# Patient Record
Sex: FEMALE | Race: WHITE | NOT HISPANIC OR LATINO | Employment: PART TIME | URBAN - METROPOLITAN AREA
[De-identification: names, ages, dates, MRNs, and addresses within clinical notes are randomized per-mention and may not be internally consistent; named-entity substitution may affect disease eponyms.]

---

## 2024-06-18 ENCOUNTER — OFFICE VISIT (OUTPATIENT)
Age: 24
End: 2024-06-18
Payer: COMMERCIAL

## 2024-06-18 ENCOUNTER — APPOINTMENT (OUTPATIENT)
Age: 24
End: 2024-06-18
Payer: COMMERCIAL

## 2024-06-18 VITALS
DIASTOLIC BLOOD PRESSURE: 73 MMHG | HEART RATE: 78 BPM | SYSTOLIC BLOOD PRESSURE: 109 MMHG | OXYGEN SATURATION: 97 % | WEIGHT: 189 LBS | BODY MASS INDEX: 34.78 KG/M2 | HEIGHT: 62 IN

## 2024-06-18 DIAGNOSIS — Z11.59 NEED FOR HEPATITIS C SCREENING TEST: ICD-10-CM

## 2024-06-18 DIAGNOSIS — Z11.4 SCREENING FOR HIV (HUMAN IMMUNODEFICIENCY VIRUS): ICD-10-CM

## 2024-06-18 DIAGNOSIS — Z13.220 ENCOUNTER FOR LIPID SCREENING FOR CARDIOVASCULAR DISEASE: ICD-10-CM

## 2024-06-18 DIAGNOSIS — E66.09 CLASS 1 OBESITY DUE TO EXCESS CALORIES WITHOUT SERIOUS COMORBIDITY WITH BODY MASS INDEX (BMI) OF 34.0 TO 34.9 IN ADULT: ICD-10-CM

## 2024-06-18 DIAGNOSIS — Z00.00 WELL ADULT EXAM: ICD-10-CM

## 2024-06-18 DIAGNOSIS — Z13.6 ENCOUNTER FOR LIPID SCREENING FOR CARDIOVASCULAR DISEASE: ICD-10-CM

## 2024-06-18 DIAGNOSIS — Z11.59 NEED FOR HEPATITIS C SCREENING TEST: Primary | ICD-10-CM

## 2024-06-18 DIAGNOSIS — J45.20 MILD INTERMITTENT ASTHMA, UNSPECIFIED WHETHER COMPLICATED: ICD-10-CM

## 2024-06-18 DIAGNOSIS — Z12.4 SCREENING FOR CERVICAL CANCER: ICD-10-CM

## 2024-06-18 PROBLEM — E66.811 CLASS 1 OBESITY DUE TO EXCESS CALORIES WITHOUT SERIOUS COMORBIDITY WITH BODY MASS INDEX (BMI) OF 34.0 TO 34.9 IN ADULT: Status: ACTIVE | Noted: 2024-06-18

## 2024-06-18 PROBLEM — J45.909 ASTHMA: Status: ACTIVE | Noted: 2022-03-04

## 2024-06-18 LAB
ALBUMIN SERPL BCP-MCNC: 4.1 G/DL (ref 3.5–5)
ALP SERPL-CCNC: 77 U/L (ref 34–104)
ALT SERPL W P-5'-P-CCNC: 17 U/L (ref 7–52)
ANION GAP SERPL CALCULATED.3IONS-SCNC: 8 MMOL/L (ref 4–13)
AST SERPL W P-5'-P-CCNC: 16 U/L (ref 13–39)
BASOPHILS # BLD AUTO: 0.08 THOUSANDS/ÂΜL (ref 0–0.1)
BASOPHILS NFR BLD AUTO: 1 % (ref 0–1)
BILIRUB SERPL-MCNC: 0.33 MG/DL (ref 0.2–1)
BUN SERPL-MCNC: 10 MG/DL (ref 5–25)
CALCIUM SERPL-MCNC: 8.9 MG/DL (ref 8.4–10.2)
CHLORIDE SERPL-SCNC: 103 MMOL/L (ref 96–108)
CHOLEST SERPL-MCNC: 145 MG/DL
CO2 SERPL-SCNC: 27 MMOL/L (ref 21–32)
CREAT SERPL-MCNC: 0.62 MG/DL (ref 0.6–1.3)
EOSINOPHIL # BLD AUTO: 0.14 THOUSAND/ÂΜL (ref 0–0.61)
EOSINOPHIL NFR BLD AUTO: 2 % (ref 0–6)
ERYTHROCYTE [DISTWIDTH] IN BLOOD BY AUTOMATED COUNT: 13.3 % (ref 11.6–15.1)
GFR SERPL CREATININE-BSD FRML MDRD: 126 ML/MIN/1.73SQ M
GLUCOSE P FAST SERPL-MCNC: 88 MG/DL (ref 65–99)
HCT VFR BLD AUTO: 37.8 % (ref 34.8–46.1)
HCV AB SER QL: NORMAL
HDLC SERPL-MCNC: 42 MG/DL
HGB BLD-MCNC: 12.4 G/DL (ref 11.5–15.4)
HIV 1+2 AB+HIV1 P24 AG SERPL QL IA: NORMAL
HIV 2 AB SERPL QL IA: NORMAL
HIV1 AB SERPL QL IA: NORMAL
HIV1 P24 AG SERPL QL IA: NORMAL
IMM GRANULOCYTES # BLD AUTO: 0.03 THOUSAND/UL (ref 0–0.2)
IMM GRANULOCYTES NFR BLD AUTO: 0 % (ref 0–2)
LDLC SERPL CALC-MCNC: 78 MG/DL (ref 0–100)
LYMPHOCYTES # BLD AUTO: 2.84 THOUSANDS/ÂΜL (ref 0.6–4.47)
LYMPHOCYTES NFR BLD AUTO: 30 % (ref 14–44)
MCH RBC QN AUTO: 27.9 PG (ref 26.8–34.3)
MCHC RBC AUTO-ENTMCNC: 32.8 G/DL (ref 31.4–37.4)
MCV RBC AUTO: 85 FL (ref 82–98)
MONOCYTES # BLD AUTO: 0.44 THOUSAND/ÂΜL (ref 0.17–1.22)
MONOCYTES NFR BLD AUTO: 5 % (ref 4–12)
NEUTROPHILS # BLD AUTO: 6 THOUSANDS/ÂΜL (ref 1.85–7.62)
NEUTS SEG NFR BLD AUTO: 62 % (ref 43–75)
NONHDLC SERPL-MCNC: 103 MG/DL
NRBC BLD AUTO-RTO: 0 /100 WBCS
PLATELET # BLD AUTO: 506 THOUSANDS/UL (ref 149–390)
PMV BLD AUTO: 9.5 FL (ref 8.9–12.7)
POTASSIUM SERPL-SCNC: 3.8 MMOL/L (ref 3.5–5.3)
PROT SERPL-MCNC: 7.2 G/DL (ref 6.4–8.4)
RBC # BLD AUTO: 4.45 MILLION/UL (ref 3.81–5.12)
SODIUM SERPL-SCNC: 138 MMOL/L (ref 135–147)
T4 FREE SERPL-MCNC: 0.9 NG/DL (ref 0.61–1.12)
TRIGL SERPL-MCNC: 124 MG/DL
TSH SERPL DL<=0.05 MIU/L-ACNC: 1.14 UIU/ML (ref 0.45–4.5)
VIT B12 SERPL-MCNC: 316 PG/ML (ref 180–914)
WBC # BLD AUTO: 9.53 THOUSAND/UL (ref 4.31–10.16)

## 2024-06-18 PROCEDURE — 36415 COLL VENOUS BLD VENIPUNCTURE: CPT

## 2024-06-18 PROCEDURE — 99385 PREV VISIT NEW AGE 18-39: CPT | Performed by: FAMILY MEDICINE

## 2024-06-18 PROCEDURE — 80061 LIPID PANEL: CPT

## 2024-06-18 PROCEDURE — 86803 HEPATITIS C AB TEST: CPT

## 2024-06-18 PROCEDURE — 82607 VITAMIN B-12: CPT

## 2024-06-18 PROCEDURE — 80053 COMPREHEN METABOLIC PANEL: CPT

## 2024-06-18 PROCEDURE — 99203 OFFICE O/P NEW LOW 30 MIN: CPT | Performed by: FAMILY MEDICINE

## 2024-06-18 PROCEDURE — 85025 COMPLETE CBC W/AUTO DIFF WBC: CPT

## 2024-06-18 PROCEDURE — 84443 ASSAY THYROID STIM HORMONE: CPT

## 2024-06-18 PROCEDURE — 84439 ASSAY OF FREE THYROXINE: CPT

## 2024-06-18 PROCEDURE — 87389 HIV-1 AG W/HIV-1&-2 AB AG IA: CPT

## 2024-06-18 RX ORDER — ALBUTEROL SULFATE 90 UG/1
2 AEROSOL, METERED RESPIRATORY (INHALATION) EVERY 6 HOURS PRN
Qty: 6.7 G | Refills: 1 | Status: SHIPPED | OUTPATIENT
Start: 2024-06-18

## 2024-06-18 NOTE — PROGRESS NOTES
Adult Annual Physical  Name: Dennise Preston      : 2000      MRN: [id number]  Encounter Provider: AUTUMN Norwood  Encounter Date: 2024   Encounter department: Count includes the [facility] PRIMARY CARE    Assessment & Plan   1. Need for hepatitis C screening test  -     Hepatitis C Antibody; Future  2. Screening for HIV (human immunodeficiency virus)  -     HIV 1/2 AG/AB w Reflex [facility] for 2 yr old and above; Future  -     HIV 1/2 AG/AB w Reflex [facility] for 2 yr old and above; Future  3. Screening for cervical cancer  4. Mild intermittent asthma, unspecified whether complicated  -     albuterol (Ventolin HFA) 90 mcg/act inhaler; Inhale 2 puffs every 6 (six) hours as needed for wheezing  5. Well adult exam  -     Lipid panel; Future  -     Comprehensive metabolic panel; Future  -     CBC and differential; Future  -     TSH + Free T4; Future  6. Class 1 obesity due to excess calories without serious comorbidity with body mass index (BMI) of 34.0 to 34.9 in adult  -     Lipid panel; Future  -     Comprehensive metabolic panel; Future  -     CBC and differential; Future  -     TSH + Free T4; Future  -     Vitamin B12; Future  7. Encounter for lipid screening for cardiovascular disease  -     Lipid panel; Future  -     Comprehensive metabolic panel; Future  -     CBC and differential; Future  -     TSH + Free T4; Future  -     Vitamin B12; Future    Immunizations and preventive care screenings were discussed with patient today. Appropriate education was printed on patient's after visit summary.    Counseling:  Exercise: the importance of regular exercise/physical activity was discussed. Recommend exercise 3-5 times per week for at least 30 minutes.          History of Present Illness   {Disappearing Hyperlinks I Encounters * My Last Note * Since Last Visit * History :29484}  Adult Annual Physical:  Patient presents for annual physical. Patient here to establish care.  When patient was 12 she has had trouble  "with weight gain. Patient tried dieting , exercising w/o losing much weight. Patient has been to the gym without losing weight.   Different medications discussed. Patient is interested in GLP1. Patient denies personal or family hx of MCT or MEN2. Denies hx of pancreatitis  .     Diet and Physical Activity:  - Diet/Nutrition: well balanced diet.  - Exercise: walking.    Depression Screening:  - PHQ-2 Score: 0    General Health:  - Sleep: sleeps well.  - Hearing: normal hearing left ear.  - Vision: no vision problems.  - Dental: regular dental visits.    /GYN Health:  - Follows with GYN: yes.   - Last menstrual cycle: 6/1/2024.     Advanced Care Planning:  - Has an advanced directive?: no    - Has a durable medical POA?: no    - ACP document given to patient?: yes      Review of Systems   Constitutional:  Negative for chills and fever.   HENT:  Negative for ear pain and sore throat.    Eyes:  Negative for pain and visual disturbance.   Respiratory:  Negative for cough and shortness of breath.    Cardiovascular:  Negative for chest pain and palpitations.   Gastrointestinal:  Negative for abdominal pain and vomiting.   Genitourinary:  Negative for dysuria and hematuria.   Musculoskeletal:  Negative for arthralgias and back pain.   Skin:  Negative for color change and rash.   Neurological:  Negative for seizures and syncope.   All other systems reviewed and are negative.        Objective   {Disappearing Hyperlinks   Review Vitals * Enter New Vitals * Results Review * Labs * Imaging * Cardiology * Procedures * Lung Cancer Screening :34940}  /73 (BP Location: Left arm, Patient Position: Sitting, Cuff Size: Standard)   Pulse 78   Ht 5' 2\" (1.575 m)   Wt 85.7 kg (189 lb)   SpO2 97%   BMI 34.57 kg/m²     Physical Exam  Vitals and nursing note reviewed.   Constitutional:       General: She is not in acute distress.     Appearance: She is well-developed. She is obese.   HENT:      Head: Normocephalic and " atraumatic.   Eyes:      Conjunctiva/sclera: Conjunctivae normal.   Cardiovascular:      Rate and Rhythm: Normal rate and regular rhythm.      Heart sounds: No murmur heard.  Pulmonary:      Effort: Pulmonary effort is normal. No respiratory distress.      Breath sounds: Normal breath sounds.   Abdominal:      Palpations: Abdomen is soft.      Tenderness: There is no abdominal tenderness.   Musculoskeletal:         General: No swelling.      Cervical back: Neck supple.   Skin:     General: Skin is warm and dry.      Capillary Refill: Capillary refill takes less than 2 seconds.   Neurological:      Mental Status: She is alert.   Psychiatric:         Mood and Affect: Mood normal.       Administrative Statements {Disappearing Hyperlinks I  Level of Service * [facility]/\[facility] [place] [facility]\""P:20655}

## 2024-06-20 NOTE — ASSESSMENT & PLAN NOTE
Weight loss discusses . Patient has tried many diets and exercise. Patient interested in GlP1. Educated on use and side effects. Patient denies personal or family hx of  MCT or MEN2. Denies hx of pancreatitis.

## 2024-06-26 ENCOUNTER — OFFICE VISIT (OUTPATIENT)
Age: 24
End: 2024-06-26
Payer: COMMERCIAL

## 2024-06-26 VITALS
DIASTOLIC BLOOD PRESSURE: 72 MMHG | HEIGHT: 62 IN | BODY MASS INDEX: 34.78 KG/M2 | WEIGHT: 189 LBS | SYSTOLIC BLOOD PRESSURE: 118 MMHG

## 2024-06-26 DIAGNOSIS — J45.20 MILD INTERMITTENT ASTHMA, UNSPECIFIED WHETHER COMPLICATED: Primary | ICD-10-CM

## 2024-06-26 DIAGNOSIS — J30.1 SEASONAL ALLERGIC RHINITIS DUE TO POLLEN: ICD-10-CM

## 2024-06-26 DIAGNOSIS — E66.09 CLASS 1 OBESITY DUE TO EXCESS CALORIES WITHOUT SERIOUS COMORBIDITY WITH BODY MASS INDEX (BMI) OF 34.0 TO 34.9 IN ADULT: ICD-10-CM

## 2024-06-26 DIAGNOSIS — R79.89 HIGH PLATELET COUNT: ICD-10-CM

## 2024-06-26 PROCEDURE — 99214 OFFICE O/P EST MOD 30 MIN: CPT | Performed by: FAMILY MEDICINE

## 2024-06-26 RX ORDER — OLOPATADINE HYDROCHLORIDE 1 MG/ML
1 SOLUTION/ DROPS OPHTHALMIC 2 TIMES DAILY
Qty: 5 ML | Refills: 1 | Status: SHIPPED | OUTPATIENT
Start: 2024-06-26

## 2024-06-26 RX ORDER — TIRZEPATIDE 2.5 MG/.5ML
2.5 INJECTION, SOLUTION SUBCUTANEOUS WEEKLY
Qty: 2 ML | Refills: 0 | Status: SHIPPED | OUTPATIENT
Start: 2024-06-26 | End: 2024-07-24

## 2024-06-26 RX ORDER — LORATADINE 10 MG/1
10 TABLET ORAL DAILY
Qty: 30 TABLET | Refills: 0 | Status: SHIPPED | OUTPATIENT
Start: 2024-06-26

## 2024-06-26 NOTE — PROGRESS NOTES
Ambulatory Visit  Name: Dennise Preston      : 2000      MRN: [id number]  Encounter Provider: AUTUMN Norwood  Encounter Date: 2024   Encounter department: [facility] PRIMARY CARE    Assessment & Plan   1. Mild intermittent asthma, unspecified whether complicated  Assessment & Plan:  Controlled with Albutero   2. Class 1 obesity due to excess calories without serious comorbidity with body mass index (BMI) of 34.0 to 34.9 in adult  Assessment & Plan:  Weight loss discusses . Patient has tried many diets and exercise. Patient interested in GlP1. Educated on use and side effects. Patient denies personal or family hx of  MCT or MEN2. Denies hx of pancreatitis.                    Orders:  -     tirzepatide (Zepbound) 2.5 mg/0.5 mL auto-injector; Inject 0.5 mL (2.5 mg total) under the skin once a week for 28 days  3. High platelet count  Assessment & Plan:  Iron panel ordered  Orders:  -     Ferritin; Future  -     TIBC Panel (incl. Iron, TIBC, % Iron Saturation); Future  -     CBC and differential; Future  -     Comprehensive metabolic panel; Future  4. Seasonal allergic rhinitis due to pollen  Assessment & Plan:  Loratadine and eye drops ordered.  Orders:  -     loratadine (CLARITIN) 10 mg tablet; Take 1 tablet (10 mg total) by mouth daily  -     olopatadine (PATANOL) 0.1 % ophthalmic solution; Administer 1 drop to both eyes 2 (two) times a day         History of Present Illness     Patient  here for a follow up.    Dennise Preston is here for chronic conditions f/u. Pt. had labs done prior to today's visit which included Recent Results (from the past 672 hour(s))  -Hepatitis C Antibody:   Collection Time: 24 12:06 PM       Result                      Value             Ref Range           Hepatitis C Ab              Non-reactive      Non-Reactive   -HIV 1/2 AG/AB w Reflex [facility] for 2 yr old and above:   Collection Time: 24 12:06 PM       Result                      Value              Ref Range           HIV-1 p24 Antigen           Non-Reactive      Non-Reactive        HIV-1 Antibody              Non-Reactive      Non-Reactive        HIV-2 Antibody              Non-Reactive      Non-Reactive        HIV Ag-Ab 5th Gen           Non-Reactive      Non-Reactive   -Lipid panel:   Collection Time: 06/18/24 12:06 PM       Result                      Value             Ref Range           Cholesterol                 145               See Comment *       Triglycerides               124               See Comment *       HDL, Direct                 42 (L)            >=50 mg/dL          LDL Calculated              78                0 - 100 mg/dL       Non-HDL-Chol (CHOL-HDL)     103               mg/dl          -Comprehensive metabolic panel:   Collection Time: 06/18/24 12:06 PM       Result                      Value             Ref Range           Sodium                      138               135 - 147 mm*       Potassium                   3.8               3.5 - 5.3 mm*       Chloride                    103               96 - 108 mmo*       CO2                         27                21 - 32 mmol*       ANION GAP                   8                 4 - 13 mmol/L       BUN                         10                5 - 25 mg/dL        Creatinine                  0.62              0.60 - 1.30 *       Glucose, Fasting            88                65 - 99 mg/dL       Calcium                     8.9               8.4 - 10.2 m*       AST                         16                13 - 39 U/L         ALT                         17                7 - 52 U/L          Alkaline Phosphatase        77                34 - 104 U/L        Total Protein               7.2               6.4 - 8.4 g/*       Albumin                     4.1               3.5 - 5.0 g/*       Total Bilirubin             0.33              0.20 - 1.00 *       eGFR                        126               ml/min/1.73s*  -CBC and differential:    Collection Time: 06/18/24 12:06 PM       Result                      Value             Ref Range           WBC                         9.53              4.31 - 10.16*       RBC                         4.45              3.81 - 5.12 *       Hemoglobin                  12.4              11.5 - 15.4 *       Hematocrit                  37.8              34.8 - 46.1 %       MCV                         85                82 - 98 fL          MCH                         27.9              26.8 - 34.3 *       MCHC                        32.8              31.4 - 37.4 *       RDW                         13.3              11.6 - 15.1 %       MPV                         9.5               8.9 - 12.7 fL       Platelets                   506 (H)           149 - 390 Th*       nRBC                        0                 /100 WBCs           Segmented %                 62                43 - 75 %           Immature Grans %            0                 0 - 2 %             Lymphocytes %               30                14 - 44 %           Monocytes %                 5                 4 - 12 %            Eosinophils Relative        2                 0 - 6 %             Basophils Relative          1                 0 - 1 %             Absolute Neutrophils        6.00              1.85 - 7.62 *       Absolute Immature Grans     0.03              0.00 - 0.20 *       Absolute Lymphocytes        2.84              0.60 - 4.47 *       Absolute Monocytes          0.44              0.17 - 1.22 *       Eosinophils Absolute        0.14              0.00 - 0.61 *       Basophils Absolute          0.08              0.00 - 0.10 *  -Vitamin B12:   Collection Time: 06/18/24 12:06 PM       Result                      Value             Ref Range           Vitamin B-12                316               180 - 914 pg*  -TSH, 3rd generation:   Collection Time: 06/18/24 12:06 PM       Result                      Value             Ref Range           TSH 3RD  "WESLY           1.136             0.450 - 4.50*  -T4, free:   Collection Time: 24 12:06 PM       Result                      Value             Ref Range           Free T4                     0.90              0.61 - 1.12 *       Review of Systems   Constitutional:  Negative for chills and fever.   HENT:  Negative for ear pain and sore throat.    Eyes:  Negative for pain and visual disturbance.   Respiratory:  Negative for cough and shortness of breath.    Cardiovascular:  Negative for chest pain and palpitations.   Gastrointestinal:  Negative for abdominal pain and vomiting.   Genitourinary:  Negative for dysuria and hematuria.   Musculoskeletal:  Negative for arthralgias and back pain.   Skin:  Negative for color change and rash.   Neurological:  Negative for seizures and syncope.   All other systems reviewed and are negative.    Past Medical History:   Diagnosis Date    Allergic      Past Surgical History:   Procedure Laterality Date     SECTION       History reviewed. No pertinent family history.  Social History     Tobacco Use    Smoking status: Never    Smokeless tobacco: Never   Vaping Use    Vaping status: Never Used   Substance and Sexual Activity    Alcohol use: Not on file    Drug use: Not on file    Sexual activity: Not on file     Current Outpatient Medications on File Prior to Visit   Medication Sig    albuterol (Ventolin HFA) 90 mcg/act inhaler Inhale 2 puffs every 6 (six) hours as needed for wheezing     Allergies   Allergen Reactions    Pollen Extract Itching       There is no immunization history on file for this patient.  Objective     /72 (BP Location: Right arm, Patient Position: Sitting, Cuff Size: Standard)   Ht 5' 2\" (1.575 m)   Wt 85.7 kg (189 lb)   BMI 34.57 kg/m²     Physical Exam  Vitals and nursing note reviewed.   Constitutional:       General: She is not in acute distress.     Appearance: She is well-developed.   HENT:      Head: Normocephalic and atraumatic. "   Eyes:      Conjunctiva/sclera: Conjunctivae normal.   Cardiovascular:      Rate and Rhythm: Normal rate and regular rhythm.      Heart sounds: No murmur heard.  Pulmonary:      Effort: Pulmonary effort is normal. No respiratory distress.      Breath sounds: Normal breath sounds.   Abdominal:      Palpations: Abdomen is soft.      Tenderness: There is no abdominal tenderness.   Musculoskeletal:         General: No swelling.      Cervical back: Neck supple.   Skin:     General: Skin is warm and dry.      Capillary Refill: Capillary refill takes less than 2 seconds.   Neurological:      Mental Status: She is alert.   Psychiatric:         Mood and Affect: Mood normal.       Administrative Statements

## 2024-06-26 NOTE — ADDENDUM NOTE
Addended by: DILAN HIGGINS on: 6/26/2024 11:33 AM     Modules accepted: Orders, Level of Service

## 2024-07-03 ENCOUNTER — PATIENT MESSAGE (OUTPATIENT)
Age: 24
End: 2024-07-03

## 2024-07-03 DIAGNOSIS — J45.20 MILD INTERMITTENT ASTHMA, UNSPECIFIED WHETHER COMPLICATED: Primary | ICD-10-CM

## 2024-07-11 ENCOUNTER — PATIENT MESSAGE (OUTPATIENT)
Age: 24
End: 2024-07-11

## 2024-07-11 RX ORDER — ALBUTEROL SULFATE 90 UG/1
2 AEROSOL, METERED RESPIRATORY (INHALATION) EVERY 6 HOURS PRN
Qty: 6.7 G | Refills: 1 | Status: SHIPPED | OUTPATIENT
Start: 2024-07-11

## 2024-08-22 ENCOUNTER — APPOINTMENT (OUTPATIENT)
Age: 24
End: 2024-08-22
Payer: COMMERCIAL

## 2024-08-22 ENCOUNTER — TELEPHONE (OUTPATIENT)
Age: 24
End: 2024-08-22

## 2024-08-22 ENCOUNTER — OFFICE VISIT (OUTPATIENT)
Age: 24
End: 2024-08-22
Payer: COMMERCIAL

## 2024-08-22 VITALS
BODY MASS INDEX: 35.96 KG/M2 | OXYGEN SATURATION: 98 % | SYSTOLIC BLOOD PRESSURE: 118 MMHG | DIASTOLIC BLOOD PRESSURE: 80 MMHG | WEIGHT: 195.4 LBS | HEART RATE: 84 BPM | HEIGHT: 62 IN

## 2024-08-22 DIAGNOSIS — F41.9 ANXIETY: ICD-10-CM

## 2024-08-22 DIAGNOSIS — J45.20 MILD INTERMITTENT ASTHMA, UNSPECIFIED WHETHER COMPLICATED: ICD-10-CM

## 2024-08-22 DIAGNOSIS — J30.1 SEASONAL ALLERGIC RHINITIS DUE TO POLLEN: ICD-10-CM

## 2024-08-22 DIAGNOSIS — R79.89 HIGH PLATELET COUNT: ICD-10-CM

## 2024-08-22 LAB
ALBUMIN SERPL BCG-MCNC: 4.1 G/DL (ref 3.5–5)
ALP SERPL-CCNC: 88 U/L (ref 34–104)
ALT SERPL W P-5'-P-CCNC: 17 U/L (ref 7–52)
ANION GAP SERPL CALCULATED.3IONS-SCNC: 9 MMOL/L (ref 4–13)
AST SERPL W P-5'-P-CCNC: 17 U/L (ref 13–39)
BASOPHILS # BLD AUTO: 0.07 THOUSANDS/ÂΜL (ref 0–0.1)
BASOPHILS NFR BLD AUTO: 1 % (ref 0–1)
BILIRUB SERPL-MCNC: 0.23 MG/DL (ref 0.2–1)
BUN SERPL-MCNC: 12 MG/DL (ref 5–25)
CALCIUM SERPL-MCNC: 9.1 MG/DL (ref 8.4–10.2)
CHLORIDE SERPL-SCNC: 102 MMOL/L (ref 96–108)
CO2 SERPL-SCNC: 24 MMOL/L (ref 21–32)
CREAT SERPL-MCNC: 0.65 MG/DL (ref 0.6–1.3)
EOSINOPHIL # BLD AUTO: 0.2 THOUSAND/ÂΜL (ref 0–0.61)
EOSINOPHIL NFR BLD AUTO: 2 % (ref 0–6)
ERYTHROCYTE [DISTWIDTH] IN BLOOD BY AUTOMATED COUNT: 13.3 % (ref 11.6–15.1)
FERRITIN SERPL-MCNC: 16 NG/ML (ref 11–307)
GFR SERPL CREATININE-BSD FRML MDRD: 123 ML/MIN/1.73SQ M
GLUCOSE SERPL-MCNC: 99 MG/DL (ref 65–140)
HCT VFR BLD AUTO: 38.3 % (ref 34.8–46.1)
HGB BLD-MCNC: 12.5 G/DL (ref 11.5–15.4)
IMM GRANULOCYTES # BLD AUTO: 0.03 THOUSAND/UL (ref 0–0.2)
IMM GRANULOCYTES NFR BLD AUTO: 0 % (ref 0–2)
IRON SATN MFR SERPL: 11 % (ref 15–50)
IRON SERPL-MCNC: 41 UG/DL (ref 50–212)
LYMPHOCYTES # BLD AUTO: 3.15 THOUSANDS/ÂΜL (ref 0.6–4.47)
LYMPHOCYTES NFR BLD AUTO: 31 % (ref 14–44)
MCH RBC QN AUTO: 27.4 PG (ref 26.8–34.3)
MCHC RBC AUTO-ENTMCNC: 32.6 G/DL (ref 31.4–37.4)
MCV RBC AUTO: 84 FL (ref 82–98)
MONOCYTES # BLD AUTO: 0.44 THOUSAND/ÂΜL (ref 0.17–1.22)
MONOCYTES NFR BLD AUTO: 4 % (ref 4–12)
NEUTROPHILS # BLD AUTO: 6.21 THOUSANDS/ÂΜL (ref 1.85–7.62)
NEUTS SEG NFR BLD AUTO: 62 % (ref 43–75)
NRBC BLD AUTO-RTO: 0 /100 WBCS
PLATELET # BLD AUTO: 498 THOUSANDS/UL (ref 149–390)
PMV BLD AUTO: 9.1 FL (ref 8.9–12.7)
POTASSIUM SERPL-SCNC: 3.8 MMOL/L (ref 3.5–5.3)
PROT SERPL-MCNC: 7.7 G/DL (ref 6.4–8.4)
RBC # BLD AUTO: 4.57 MILLION/UL (ref 3.81–5.12)
SODIUM SERPL-SCNC: 135 MMOL/L (ref 135–147)
TIBC SERPL-MCNC: 358 UG/DL (ref 250–450)
UIBC SERPL-MCNC: 317 UG/DL (ref 155–355)
WBC # BLD AUTO: 10.1 THOUSAND/UL (ref 4.31–10.16)

## 2024-08-22 PROCEDURE — 85025 COMPLETE CBC W/AUTO DIFF WBC: CPT

## 2024-08-22 PROCEDURE — 83540 ASSAY OF IRON: CPT

## 2024-08-22 PROCEDURE — 80053 COMPREHEN METABOLIC PANEL: CPT

## 2024-08-22 PROCEDURE — 36415 COLL VENOUS BLD VENIPUNCTURE: CPT

## 2024-08-22 PROCEDURE — 82728 ASSAY OF FERRITIN: CPT

## 2024-08-22 PROCEDURE — 99214 OFFICE O/P EST MOD 30 MIN: CPT | Performed by: FAMILY MEDICINE

## 2024-08-22 PROCEDURE — 83550 IRON BINDING TEST: CPT

## 2024-08-22 RX ORDER — BUPROPION HYDROCHLORIDE 150 MG/1
150 TABLET ORAL EVERY MORNING
Qty: 30 TABLET | Refills: 5 | Status: SHIPPED | OUTPATIENT
Start: 2024-08-22 | End: 2025-02-18

## 2024-08-22 NOTE — TELEPHONE ENCOUNTER
Called Pt in regards to referral received, in attempts to verify needs of services and advised of current wait list.  No answer, lvm for patient to call back at [phone], opt 3.    1st attempt.

## 2024-08-22 NOTE — PROGRESS NOTES
Ambulatory Visit  Name: Dennise Preston      : 2000      MRN: [id number]  Encounter Provider: AUTUMN Norwood  Encounter Date: 2024   Encounter department: [facility] PRIMARY CARE    Assessment & Plan   1. BMI 35.0-35.9,adult  Assessment & Plan:  Weight loss discusses . Patient has tried many diets and exercise. Patient interested in GlP1. Educated on use and side effects. Patient denies personal or family hx of  MCT or MEN2. Denies hx of pancreatitis.      2. Mild intermittent asthma, unspecified whether complicated  Assessment & Plan:  Controlled with Albuterol  3. Seasonal allergic rhinitis due to pollen  Assessment & Plan:  Loratadine and eye drops ordered.  4. High platelet count  Assessment & Plan:  Iron panel ordered  5. Anxiety  Assessment & Plan:  Will order Wellbutrin. Patient encouraged to talk to a therapist  Orders:  -     buPROPion (WELLBUTRIN XL) 150 mg 24 hr tablet; Take 1 tablet (150 mg total) by mouth every morning  -     Ambulatory referral to Psych Services; Future         History of Present Illness     Patient seen for a follow up. Patient is still struggling to lose weight. She is very stressed because she has tried so many different diets and exercise and is not able to lose weight at all. Zepbound was denied by her insurance       Review of Systems   Constitutional:  Negative for chills and fever.   HENT:  Negative for ear pain and sore throat.    Eyes:  Negative for pain and visual disturbance.   Respiratory:  Negative for cough and shortness of breath.    Cardiovascular:  Negative for chest pain and palpitations.   Gastrointestinal:  Negative for abdominal pain and vomiting.   Genitourinary:  Negative for dysuria and hematuria.   Musculoskeletal:  Negative for arthralgias and back pain.   Skin:  Negative for color change and rash.   Neurological:  Negative for seizures and syncope.   Psychiatric/Behavioral:  The patient is nervous/anxious.    All other systems  "reviewed and are negative.    Past Medical History:   Diagnosis Date    Allergic      Past Surgical History:   Procedure Laterality Date     SECTION       History reviewed. No pertinent family history.  Social History     Tobacco Use    Smoking status: Never    Smokeless tobacco: Never   Vaping Use    Vaping status: Never Used   Substance and Sexual Activity    Alcohol use: Not on file    Drug use: Not on file    Sexual activity: Not on file     Current Outpatient Medications on File Prior to Visit   Medication Sig    albuterol (ProAir HFA) 90 mcg/act inhaler Inhale 2 puffs every 6 (six) hours as needed for wheezing    albuterol (Ventolin HFA) 90 mcg/act inhaler Inhale 2 puffs every 6 (six) hours as needed for wheezing    loratadine (CLARITIN) 10 mg tablet Take 1 tablet (10 mg total) by mouth daily    olopatadine (PATANOL) 0.1 % ophthalmic solution Administer 1 drop to both eyes 2 (two) times a day     Allergies   Allergen Reactions    Pollen Extract Itching       There is no immunization history on file for this patient.  Objective     /80 (BP Location: Left arm, Patient Position: Sitting, Cuff Size: Standard)   Pulse 84   Ht 5' 2\" (1.575 m)   Wt 88.6 kg (195 lb 6.4 oz)   SpO2 98%   BMI 35.74 kg/m²     Physical Exam  Vitals and nursing note reviewed.   Constitutional:       General: She is not in acute distress.     Appearance: She is well-developed. She is obese.   HENT:      Head: Normocephalic and atraumatic.   Eyes:      Conjunctiva/sclera: Conjunctivae normal.   Cardiovascular:      Rate and Rhythm: Normal rate and regular rhythm.      Heart sounds: No murmur heard.  Pulmonary:      Effort: Pulmonary effort is normal. No respiratory distress.      Breath sounds: Normal breath sounds.   Abdominal:      Palpations: Abdomen is soft.      Tenderness: There is no abdominal tenderness.   Musculoskeletal:         General: No swelling.      Cervical back: Neck supple.   Skin:     General: Skin is " warm and dry.      Capillary Refill: Capillary refill takes less than 2 seconds.   Neurological:      Mental Status: She is alert.   Psychiatric:         Mood and Affect: Mood normal.

## 2024-08-26 NOTE — TELEPHONE ENCOUNTER
Called Pt in regards to referral received, in attempts to verify needs of services and advised of current wait list.  No answer, lvm for patient to call back at [phone], opt 3.     2nd attempt.

## 2024-09-05 ENCOUNTER — OFFICE VISIT (OUTPATIENT)
Age: 24
End: 2024-09-05
Payer: COMMERCIAL

## 2024-09-05 VITALS
BODY MASS INDEX: 35.81 KG/M2 | HEIGHT: 62 IN | HEART RATE: 78 BPM | WEIGHT: 194.6 LBS | SYSTOLIC BLOOD PRESSURE: 122 MMHG | OXYGEN SATURATION: 98 % | DIASTOLIC BLOOD PRESSURE: 82 MMHG

## 2024-09-05 DIAGNOSIS — F41.9 ANXIETY: ICD-10-CM

## 2024-09-05 DIAGNOSIS — F33.1 MODERATE EPISODE OF RECURRENT MAJOR DEPRESSIVE DISORDER (HCC): Primary | ICD-10-CM

## 2024-09-05 DIAGNOSIS — R79.89 HIGH PLATELET COUNT: ICD-10-CM

## 2024-09-05 PROCEDURE — 99214 OFFICE O/P EST MOD 30 MIN: CPT | Performed by: FAMILY MEDICINE

## 2024-09-05 RX ORDER — BUPROPION HYDROCHLORIDE 150 MG/1
150 TABLET ORAL EVERY MORNING
Qty: 30 TABLET | Refills: 5 | Status: SHIPPED | OUTPATIENT
Start: 2024-09-05 | End: 2025-03-04

## 2024-09-05 RX ORDER — FERROUS SULFATE 324(65)MG
324 TABLET, DELAYED RELEASE (ENTERIC COATED) ORAL
Qty: 90 TABLET | Refills: 1 | Status: SHIPPED | OUTPATIENT
Start: 2024-09-05

## 2024-09-05 NOTE — PROGRESS NOTES
Ambulatory Visit  Name: Dennise Preston      : 2000      MRN: [id number]  Encounter Provider: AUTUMN Norwood  Encounter Date: 2024   Encounter department: [facility] PRIMARY CARE    Assessment & Plan   1. Moderate episode of recurrent major depressive disorder (HCC)  Assessment & Plan:  Patient  is going to try Wellbutrin. She is waiting for a call from [facility] Mental health services  2. Anxiety  Assessment & Plan:  Increased since father's death anniversary is coming up. FMLA forms filled out. Wellbutrin ordered and patient will follow up with Mental health services.  Orders:  -     buPROPion (WELLBUTRIN XL) 150 mg 24 hr tablet; Take 1 tablet (150 mg total) by mouth every morning  3. High platelet count  Assessment & Plan:  Iron panel ordered. Low iron saturation. Ferrous sulfate ordered.  4. BMI 35.0-35.9,adult  Assessment & Plan:  Weight loss discusses . Patient has tried many diets and exercise. Patient interested in GlP1. Educated on use and side effects. Patient denies personal or family hx of  MCT or MEN2. Denies hx of pancreatitis.             History of Present Illness     Patient here for a follow up for her depression and anxiety. Patient has had increased anxiety especially at work. She gets frequent panic attacks while at work which affects her ability to focus on her job. She gets shortness of breath and palpitations and feeling of impending doom. She feel better at home when she is relaxed. Patient was diagnosed with depression and anxiety when she was 14 and was on Zoloft. Her father  when she was 12 and depression and anxiety runs in her family. Usually it last for a few months, goes away and returns. Patient was in [facility] with [facility]    Review of Systems   Constitutional:  Negative for chills and fever.   HENT:  Negative for ear pain and sore throat.    Eyes:  Negative for pain and visual disturbance.   Respiratory:  Negative  "for cough and shortness of breath.    Cardiovascular:  Negative for chest pain and palpitations.   Gastrointestinal:  Negative for abdominal pain and vomiting.   Genitourinary:  Negative for dysuria and hematuria.   Musculoskeletal:  Negative for arthralgias and back pain.   Skin:  Negative for color change and rash.   Neurological:  Negative for seizures and syncope.   Psychiatric/Behavioral:  Positive for dysphoric mood. The patient is nervous/anxious.    All other systems reviewed and are negative.    Past Medical History:   Diagnosis Date   • Allergic      Past Surgical History:   Procedure Laterality Date   •  SECTION       History reviewed. No pertinent family history.  Social History     Tobacco Use   • Smoking status: Never   • Smokeless tobacco: Never   Vaping Use   • Vaping status: Never Used   Substance and Sexual Activity   • Alcohol use: Not on file   • Drug use: Not on file   • Sexual activity: Not on file     Current Outpatient Medications on File Prior to Visit   Medication Sig   • albuterol (ProAir HFA) 90 mcg/act inhaler Inhale 2 puffs every 6 (six) hours as needed for wheezing   • albuterol (Ventolin HFA) 90 mcg/act inhaler Inhale 2 puffs every 6 (six) hours as needed for wheezing   • loratadine (CLARITIN) 10 mg tablet Take 1 tablet (10 mg total) by mouth daily   • olopatadine (PATANOL) 0.1 % ophthalmic solution Administer 1 drop to both eyes 2 (two) times a day   • [DISCONTINUED] buPROPion (WELLBUTRIN XL) 150 mg 24 hr tablet Take 1 tablet (150 mg total) by mouth every morning     Allergies   Allergen Reactions   • Pollen Extract Itching       There is no immunization history on file for this patient.  Objective     /82 (BP Location: Left arm, Patient Position: Sitting, Cuff Size: Standard)   Pulse 78   Ht 5' 2\" (1.575 m)   Wt 88.3 kg (194 lb 9.6 oz)   SpO2 98%   BMI 35.59 kg/m²     Physical Exam  Vitals and nursing note reviewed.   Constitutional:       General: She is not in " acute distress.     Appearance: She is well-developed.   HENT:      Head: Normocephalic and atraumatic.   Eyes:      Conjunctiva/sclera: Conjunctivae normal.   Cardiovascular:      Rate and Rhythm: Normal rate and regular rhythm.      Heart sounds: No murmur heard.  Pulmonary:      Effort: Pulmonary effort is normal. No respiratory distress.      Breath sounds: Normal breath sounds.   Abdominal:      Palpations: Abdomen is soft.      Tenderness: There is no abdominal tenderness.   Musculoskeletal:         General: No swelling.      Cervical back: Neck supple.   Skin:     General: Skin is warm and dry.      Capillary Refill: Capillary refill takes less than 2 seconds.   Neurological:      Mental Status: She is alert and oriented to person, place, and time.   Psychiatric:         Mood and Affect: Mood is anxious. Affect is flat.

## 2024-09-05 NOTE — ASSESSMENT & PLAN NOTE
Patient  is going to try Wellbutrin. She is waiting for a call from [facility] services

## 2024-09-05 NOTE — ASSESSMENT & PLAN NOTE
Increased since father's death anniversary is coming up. FMLA forms filled out. Wellbutrin ordered and patient will follow up with Mental health services.

## 2024-09-16 ENCOUNTER — TELEPHONE (OUTPATIENT)
Age: 24
End: 2024-09-16

## 2024-10-02 ENCOUNTER — TELEPHONE (OUTPATIENT)
Dept: ADMINISTRATIVE | Facility: OTHER | Age: 24
End: 2024-10-02

## 2024-10-02 NOTE — TELEPHONE ENCOUNTER
Upon review of the In Basket request we were able to locate, review, and update the patient chart as requested for Pap Smear (HPV) aka Cervical Cancer Screening.    Any additional questions or concerns should be emailed to the Practice Liaisons via the appropriate education email address, please do not reply via In Basket.    Thank you  GWEN PILLAI MA   [facility]

## 2024-10-02 NOTE — TELEPHONE ENCOUNTER
----- Message from Brenda FLORES sent at 10/2/2024  9:04 AM EDT -----  Regarding: Care Gap Request  10/02/24 9:04 AM    Hello, our patient Dennise Preston has had Pap Smear (HPV) aka Cervical Cancer Screening completed/performed. Please assist in updating the patient chart by pulling the Care Everywhere (CE) document. The date of service is 3/4/22.     Thank you,  Brenda Wood MA  [facility] PRIMARY CARE

## 2024-10-03 ENCOUNTER — OFFICE VISIT (OUTPATIENT)
Age: 24
End: 2024-10-03
Payer: COMMERCIAL

## 2024-10-03 VITALS
HEART RATE: 80 BPM | WEIGHT: 195.8 LBS | SYSTOLIC BLOOD PRESSURE: 105 MMHG | HEIGHT: 62 IN | DIASTOLIC BLOOD PRESSURE: 72 MMHG | BODY MASS INDEX: 36.03 KG/M2 | OXYGEN SATURATION: 97 %

## 2024-10-03 DIAGNOSIS — F41.9 ANXIETY: Primary | ICD-10-CM

## 2024-10-03 DIAGNOSIS — J45.20 MILD INTERMITTENT ASTHMA, UNSPECIFIED WHETHER COMPLICATED: ICD-10-CM

## 2024-10-03 DIAGNOSIS — R79.89 HIGH PLATELET COUNT: ICD-10-CM

## 2024-10-03 DIAGNOSIS — E66.811 CLASS 1 OBESITY DUE TO EXCESS CALORIES WITHOUT SERIOUS COMORBIDITY WITH BODY MASS INDEX (BMI) OF 34.0 TO 34.9 IN ADULT: ICD-10-CM

## 2024-10-03 DIAGNOSIS — E66.09 CLASS 1 OBESITY DUE TO EXCESS CALORIES WITHOUT SERIOUS COMORBIDITY WITH BODY MASS INDEX (BMI) OF 34.0 TO 34.9 IN ADULT: ICD-10-CM

## 2024-10-03 PROCEDURE — 99214 OFFICE O/P EST MOD 30 MIN: CPT | Performed by: FAMILY MEDICINE

## 2024-10-03 NOTE — ASSESSMENT & PLAN NOTE
GLP1 ordered however patient's insurance did not cover it. Patient educated on healthy, balanced diet and exercise 30 minutes a day recommended

## 2024-10-03 NOTE — PROGRESS NOTES
Ambulatory Visit  Name: Dennise Preston      : 2000      MRN: [id number]  Encounter Provider: AUTUMN Norwood  Encounter Date: 10/3/2024   Encounter department: [facility] PRIMARY CARE    Assessment & Plan  Anxiety  Increased since father's death anniversary is coming up. FMLA forms filled out. Wellbutrin gave patient palpitations so she stopped taking it.patient will follow up with Mental health services.          High platelet count  Iron panel ordered. Low iron saturation. Ferrous sulfate ordered.         Class 1 obesity due to excess calories without serious comorbidity with body mass index (BMI) of 34.0 to 34.9 in adult  GLP1 ordered however patient's insurance did not cover it. Patient educated on healthy, balanced diet and exercise 30 minutes a day recommended         Mild intermittent asthma, unspecified whether complicated  Controlled with Albuterol              History of Present Illness     Patient here for a follow up. Patient had reaction to Wellbutrin , she developed palpitations. Patient stopped taking it 2 weeks ago and is feeling much better.      Review of Systems   Constitutional:  Negative for chills and fever.   HENT:  Negative for ear pain and sore throat.    Eyes:  Negative for pain and visual disturbance.   Respiratory:  Negative for cough and shortness of breath.    Cardiovascular:  Negative for chest pain and palpitations.   Gastrointestinal:  Negative for abdominal pain and vomiting.   Genitourinary:  Negative for dysuria and hematuria.   Musculoskeletal:  Negative for arthralgias and back pain.   Skin:  Negative for color change and rash.   Neurological:  Negative for seizures and syncope.   Psychiatric/Behavioral:  The patient is nervous/anxious.    All other systems reviewed and are negative.    Past Medical History:   Diagnosis Date    Allergic      Past Surgical History:   Procedure Laterality Date     SECTION       History reviewed. No pertinent  "family history.  Social History     Tobacco Use    Smoking status: Never    Smokeless tobacco: Never   Vaping Use    Vaping status: Never Used   Substance and Sexual Activity    Alcohol use: Not on file    Drug use: Not on file    Sexual activity: Not on file     Current Outpatient Medications on File Prior to Visit   Medication Sig    albuterol (ProAir HFA) 90 mcg/act inhaler Inhale 2 puffs every 6 (six) hours as needed for wheezing    albuterol (Ventolin HFA) 90 mcg/act inhaler Inhale 2 puffs every 6 (six) hours as needed for wheezing    ferrous sulfate 324 (65 Fe) mg Take 1 tablet (324 mg total) by mouth 2 (two) times a day before meals    loratadine (CLARITIN) 10 mg tablet Take 1 tablet (10 mg total) by mouth daily    olopatadine (PATANOL) 0.1 % ophthalmic solution Administer 1 drop to both eyes 2 (two) times a day    [DISCONTINUED] buPROPion (WELLBUTRIN XL) 150 mg 24 hr tablet Take 1 tablet (150 mg total) by mouth every morning     Allergies   Allergen Reactions    Pollen Extract Itching    Wellbutrin [Bupropion] Palpitations       There is no immunization history on file for this patient.  Objective     /72 (BP Location: Left arm, Patient Position: Sitting, Cuff Size: Standard)   Pulse 80   Ht 5' 2\" (1.575 m)   Wt 88.8 kg (195 lb 12.8 oz)   SpO2 97%   BMI 35.81 kg/m²     Physical Exam  Vitals and nursing note reviewed.   Constitutional:       General: She is not in acute distress.     Appearance: She is well-developed. She is obese.   HENT:      Head: Normocephalic and atraumatic.   Eyes:      Conjunctiva/sclera: Conjunctivae normal.   Cardiovascular:      Rate and Rhythm: Normal rate and regular rhythm.      Heart sounds: No murmur heard.  Pulmonary:      Effort: Pulmonary effort is normal. No respiratory distress.      Breath sounds: Normal breath sounds.   Abdominal:      Palpations: Abdomen is soft.      Tenderness: There is no abdominal tenderness.   Musculoskeletal:         General: No " swelling.      Cervical back: Neck supple.   Skin:     General: Skin is warm and dry.      Capillary Refill: Capillary refill takes less than 2 seconds.   Neurological:      Mental Status: She is alert.   Psychiatric:         Mood and Affect: Mood normal.

## 2024-10-22 ENCOUNTER — TELEPHONE (OUTPATIENT)
Age: 24
End: 2024-10-22

## 2024-10-22 NOTE — TELEPHONE ENCOUNTER
Pt called in looking for Cheryl tried to warm transfer the call went unanswered. Kindly call back patient. Thanks

## 2024-10-23 ENCOUNTER — TELEPHONE (OUTPATIENT)
Age: 24
End: 2024-10-23

## 2024-10-23 NOTE — TELEPHONE ENCOUNTER
Patient called about a prior auth. Insurance said her auth is missing a prior auth # which is [id number]. They also told her to have it marked urgent. The Fax#[phone]. She has been waiting for 3 months to get this settled. Can we help

## 2024-10-29 NOTE — TELEPHONE ENCOUNTER
Pt called again upset regarding this paperwork that should have been submitted 3 months ago. The paperwork was submitted incomplete. They     Must have the Auth# [id number].  Please refax with a message that says URGENT so they can handle it quickly. Please and thank you.

## 2024-10-31 ENCOUNTER — TELEPHONE (OUTPATIENT)
Dept: FAMILY MEDICINE CLINIC | Facility: CLINIC | Age: 24
End: 2024-10-31

## 2024-10-31 DIAGNOSIS — E66.01 CLASS 2 SEVERE OBESITY DUE TO EXCESS CALORIES WITH SERIOUS COMORBIDITY AND BODY MASS INDEX (BMI) OF 35.0 TO 35.9 IN ADULT (HCC): Primary | ICD-10-CM

## 2024-10-31 DIAGNOSIS — E66.812 CLASS 2 SEVERE OBESITY DUE TO EXCESS CALORIES WITH SERIOUS COMORBIDITY AND BODY MASS INDEX (BMI) OF 35.0 TO 35.9 IN ADULT (HCC): Primary | ICD-10-CM

## 2024-10-31 RX ORDER — TIRZEPATIDE 2.5 MG/.5ML
2.5 INJECTION, SOLUTION SUBCUTANEOUS WEEKLY
Qty: 2 ML | Refills: 0 | Status: SHIPPED | OUTPATIENT
Start: 2024-10-31 | End: 2024-11-28

## 2024-10-31 NOTE — TELEPHONE ENCOUNTER
Please process prior auth for recent prescription.       tirzepatide (Zepbound) 2.5 mg/0.5 mL auto-injector [[id number]]    Order Details  Dose: 2.5 mg Route: Subcutaneous Frequency: Weekly   Dispense Quantity: 2 mL Refills: 0          Sig: Inject 0.5 mL (2.5 mg total) under the skin once a week for 28 days         Start Date: 10/31/24 End Date: 11/28/24 after 4 doses   Written Date: 10/31/24 Expiration Date: 10/31/25       Associated Diagnoses: Class 2 severe obesity due to excess calories with serious comorbidity and body mass index (BMI) of 35.0 to 35.9 in adult (HCC) [E66.812, E66.01, Z68.35]

## 2024-11-01 ENCOUNTER — TELEPHONE (OUTPATIENT)
Age: 24
End: 2024-11-01

## 2024-11-01 NOTE — TELEPHONE ENCOUNTER
PA for (Zepbound) 2.5 mg  EXCLUDED from plan       Reason:(Screenshot if applicable)        Message sent to office clinical pool Yes

## 2024-11-01 NOTE — TELEPHONE ENCOUNTER
PA for (Zepbound) 2.5 mg SUBMITTED     via    [x]CMM-KEY: [id number]  []Surescripts-Case ID #   []Availity-Auth ID # NDC #   []Faxed to plan   []Other website   []Phone call Case ID #     Office notes sent, clinical questions answered. Awaiting determination    Turnaround time for your insurance to make a decision on your Prior Authorization can take 7-21 business days.

## 2024-11-01 NOTE — TELEPHONE ENCOUNTER
Prior auth for [facility] via cover my meds for Zepbound 2.5mg/0.5ml auto-injectors.      Key: [id number]  Patient Last Name: Kary  : 2000      Please process,

## 2024-11-06 RX ORDER — POLYMYXIN B SULFATE AND TRIMETHOPRIM 1; 10000 MG/ML; [USP'U]/ML
SOLUTION OPHTHALMIC
COMMUNITY
Start: 2024-10-25

## 2024-11-07 ENCOUNTER — OFFICE VISIT (OUTPATIENT)
Age: 24
End: 2024-11-07
Payer: COMMERCIAL

## 2024-11-07 VITALS
OXYGEN SATURATION: 97 % | WEIGHT: 196 LBS | BODY MASS INDEX: 36.07 KG/M2 | HEART RATE: 94 BPM | DIASTOLIC BLOOD PRESSURE: 72 MMHG | SYSTOLIC BLOOD PRESSURE: 118 MMHG | HEIGHT: 62 IN

## 2024-11-07 DIAGNOSIS — E66.811 CLASS 1 OBESITY DUE TO EXCESS CALORIES WITHOUT SERIOUS COMORBIDITY WITH BODY MASS INDEX (BMI) OF 34.0 TO 34.9 IN ADULT: ICD-10-CM

## 2024-11-07 DIAGNOSIS — F33.1 MODERATE EPISODE OF RECURRENT MAJOR DEPRESSIVE DISORDER (HCC): ICD-10-CM

## 2024-11-07 DIAGNOSIS — F41.9 ANXIETY: ICD-10-CM

## 2024-11-07 DIAGNOSIS — E66.812 CLASS 2 SEVERE OBESITY DUE TO EXCESS CALORIES WITH SERIOUS COMORBIDITY AND BODY MASS INDEX (BMI) OF 35.0 TO 35.9 IN ADULT (HCC): Primary | ICD-10-CM

## 2024-11-07 DIAGNOSIS — E66.09 CLASS 1 OBESITY DUE TO EXCESS CALORIES WITHOUT SERIOUS COMORBIDITY WITH BODY MASS INDEX (BMI) OF 34.0 TO 34.9 IN ADULT: ICD-10-CM

## 2024-11-07 DIAGNOSIS — J45.20 MILD INTERMITTENT ASTHMA, UNSPECIFIED WHETHER COMPLICATED: ICD-10-CM

## 2024-11-07 DIAGNOSIS — J30.1 SEASONAL ALLERGIC RHINITIS DUE TO POLLEN: ICD-10-CM

## 2024-11-07 DIAGNOSIS — E66.01 CLASS 2 SEVERE OBESITY DUE TO EXCESS CALORIES WITH SERIOUS COMORBIDITY AND BODY MASS INDEX (BMI) OF 35.0 TO 35.9 IN ADULT (HCC): Primary | ICD-10-CM

## 2024-11-07 PROCEDURE — 99214 OFFICE O/P EST MOD 30 MIN: CPT | Performed by: FAMILY MEDICINE

## 2024-11-07 NOTE — ASSESSMENT & PLAN NOTE
Patient tried Wellbutrin but made her even more anxious. She heard from [facility] and was told she can not be seen until February. She is looking for another Mental health provider in the area. Will extend leave for now until she gets appropriate help

## 2024-11-07 NOTE — PROGRESS NOTES
"Ambulatory Visit  Name: Dennise Preston      : 2000      MRN: [id number]  Encounter Provider: AUTUMN Norwood  Encounter Date: 2024   Encounter department: [facility] PRIMARY CARE    Assessment & Plan  Class 2 severe obesity due to excess calories with serious comorbidity and body mass index (BMI) of 35.0 to 35.9 in adult (HCC)      Orders:    Ambulatory Referral to Weight Management; Future    Mild intermittent asthma, unspecified whether complicated  Controlled with Albuterol               Seasonal allergic rhinitis due to pollen  Loratadine and eye drops ordered.         Class 1 obesity due to excess calories without serious comorbidity with body mass index (BMI) of 34.0 to 34.9 in adult  GLP1 ordered however patient's insurance did not cover it. Patient educated on healthy, balanced diet and exercise 30 minutes a day recommended          Moderate episode of recurrent major depressive disorder (HCC)  Patient tried Wellbutrin but made her even more anxious. She heard from [facility] and was told she can not be seen until February. She is looking for another Mental health provider in the area. Will extend leave for now until she gets appropriate help         Anxiety  Increased since father's death anniversary is coming up. FMLA forms filled out. Wellbutrin gave patient palpitations so she stopped taking it.patient will follow up with Mental health services. They could not see her until February so she will look for someone in the area.               History of Present Illness     Patient here for a follow up. Patient was denied for her Wegovy. She is also still suffering from anxiety. The anxiety gtes much worse while she is at work. Recently was  the anniversary of her father's death which also makes the anxiety much worse. Patient gets palpitations to the point that she feels like she will pass out. She starts shaking and have a feeling of \" impending doom\"          Review of " "Systems   Constitutional:  Negative for chills and fever.   HENT:  Negative for ear pain and sore throat.    Eyes:  Negative for pain and visual disturbance.   Respiratory:  Positive for shortness of breath. Negative for cough.    Cardiovascular:  Positive for palpitations. Negative for chest pain.   Gastrointestinal:  Negative for abdominal pain and vomiting.   Genitourinary:  Negative for dysuria and hematuria.   Musculoskeletal:  Negative for arthralgias and back pain.   Skin:  Negative for color change and rash.   Neurological:  Negative for seizures and syncope.   Psychiatric/Behavioral:  Positive for behavioral problems. The patient is nervous/anxious.    All other systems reviewed and are negative.          Objective     /72   Pulse 94   Ht 5' 2\" (1.575 m)   Wt 88.9 kg (196 lb)   SpO2 97%   BMI 35.85 kg/m²     Physical Exam  Vitals and nursing note reviewed.   Constitutional:       General: She is not in acute distress.     Appearance: She is well-developed.   HENT:      Head: Normocephalic and atraumatic.   Eyes:      Conjunctiva/sclera: Conjunctivae normal.   Cardiovascular:      Rate and Rhythm: Normal rate and regular rhythm.      Heart sounds: No murmur heard.  Pulmonary:      Effort: Pulmonary effort is normal. No respiratory distress.      Breath sounds: Normal breath sounds.   Abdominal:      Palpations: Abdomen is soft.      Tenderness: There is no abdominal tenderness.   Musculoskeletal:         General: No swelling.      Cervical back: Neck supple.   Skin:     General: Skin is warm and dry.      Capillary Refill: Capillary refill takes less than 2 seconds.   Neurological:      Mental Status: She is alert.   Psychiatric:         Mood and Affect: Mood is anxious. Affect is tearful.         Behavior: Behavior is agitated. Behavior is cooperative.         "

## 2024-11-07 NOTE — ASSESSMENT & PLAN NOTE
Increased since father's death anniversary is coming up. FMLA forms filled out. Wellbutrin gave patient palpitations so she stopped taking it.patient will follow up with Mental health services. They could not see her until February so she will look for someone in the area.

## 2024-11-14 ENCOUNTER — TELEPHONE (OUTPATIENT)
Age: 24
End: 2024-11-14

## 2024-11-14 NOTE — TELEPHONE ENCOUNTER
Prior auth for [facility] for [employer] for Zepbound 2.5mg/0.5 ml pen.      Prior auth code: [id number]  Patient Last Name: Kary  : 2000        Please process      *Note: insurance said they would look at approving as long as the auth code is in the prior auth paperwork**

## 2024-11-18 NOTE — TELEPHONE ENCOUNTER
PA for Zepbound 2.5MG/0.5ML SUBMITTED to [employer]    via    []CMM-KEY:   [x]Surescripts-Case ID # [id number]    []Availity-Auth ID # NDC #   []Faxed to plan   []Other website   []Phone call Case ID #     [x]PA sent as URGENT    All office notes, labs and other pertaining documents and studies sent. Clinical questions answered. Awaiting determination from insurance company.     Turnaround time for your insurance to make a decision on your Prior Authorization can take 7-21 business days.

## 2024-11-18 NOTE — TELEPHONE ENCOUNTER
PA for Zepbound 2.5MG/0.5ML APPROVED     Date(s) approved: 11/18/2024-05/18/2025    Case #[id number]       Patient advised by          [x]MyChart Message-Communication consent incomplete  []Phone call   []LMOM  []L/M to call office as no active Communication consent on file  []Unable to leave detailed message as VM not approved on Communication consent       Pharmacy advised by    [x]Fax  []Phone call    Approval letter scanned into Media No Waiting for letter

## 2024-12-12 ENCOUNTER — TELEMEDICINE (OUTPATIENT)
Age: 24
End: 2024-12-12
Payer: COMMERCIAL

## 2024-12-12 VITALS — HEIGHT: 62 IN | WEIGHT: 188 LBS | BODY MASS INDEX: 34.6 KG/M2

## 2024-12-12 DIAGNOSIS — F41.9 ANXIETY: ICD-10-CM

## 2024-12-12 DIAGNOSIS — J30.1 SEASONAL ALLERGIC RHINITIS DUE TO POLLEN: ICD-10-CM

## 2024-12-12 DIAGNOSIS — E66.811 CLASS 1 OBESITY DUE TO EXCESS CALORIES WITHOUT SERIOUS COMORBIDITY WITH BODY MASS INDEX (BMI) OF 34.0 TO 34.9 IN ADULT: ICD-10-CM

## 2024-12-12 DIAGNOSIS — E66.09 CLASS 1 OBESITY DUE TO EXCESS CALORIES WITHOUT SERIOUS COMORBIDITY WITH BODY MASS INDEX (BMI) OF 34.0 TO 34.9 IN ADULT: ICD-10-CM

## 2024-12-12 DIAGNOSIS — J45.20 MILD INTERMITTENT ASTHMA, UNSPECIFIED WHETHER COMPLICATED: Primary | ICD-10-CM

## 2024-12-12 PROCEDURE — 99214 OFFICE O/P EST MOD 30 MIN: CPT | Performed by: FAMILY MEDICINE

## 2024-12-12 RX ORDER — TIRZEPATIDE 2.5 MG/.5ML
INJECTION, SOLUTION SUBCUTANEOUS
COMMUNITY
Start: 2024-11-21 | End: 2024-12-12

## 2024-12-12 RX ORDER — TIRZEPATIDE 5 MG/.5ML
5 INJECTION, SOLUTION SUBCUTANEOUS WEEKLY
Qty: 2 ML | Refills: 0 | Status: SHIPPED | OUTPATIENT
Start: 2024-12-12

## 2024-12-12 NOTE — PROGRESS NOTES
Virtual Regular Visit  Name: Dennise Preston      : 2000      MRN: [id number]  Encounter Provider: AUTUMN Norwood  Encounter Date: 2024   Encounter department: [facility] PRIMARY CARE      Verification of patient location:  Patient is located at Home in the following state in which I hold an active license [place] :  Assessment & Plan        Encounter provider AUTUMN Norwood    The patient was identified by name and date of birth. Dennise Preston was informed that this is a telemedicine visit and that the visit is being conducted through the Epic Embedded platform. She agrees to proceed..  My office door was closed. No one else was in the room.  She acknowledged consent and understanding of privacy and security of the video platform. The patient has agreed to participate and understands they can discontinue the visit at any time.    Patient is aware this is a billable service.     History of Present Illness     Patient seen via Telehealth. Patient has been on Zepbound for 3 weeks  and has lost 8 pounds. Now her weight is 188 lbs. Patient is still suffering from anxiety and depression. Especially has been hard around the holidays and when she is around people.  Patient is still trying to get a hold of therapist. She is on many waiting lists.  They usually don't take new patients or don't take her insurance.      Review of Systems   Constitutional:  Negative for chills and fever.   HENT:  Negative for ear pain and sore throat.    Eyes:  Negative for pain and visual disturbance.   Respiratory:  Negative for cough and shortness of breath.    Cardiovascular:  Negative for chest pain and palpitations.   Gastrointestinal:  Negative for abdominal pain and vomiting.   Genitourinary:  Negative for dysuria and hematuria.   Musculoskeletal:  Negative for arthralgias and back pain.   Skin:  Negative for color change and rash.   Neurological:  Negative for seizures and syncope.    Psychiatric/Behavioral:  The patient is nervous/anxious.    All other systems reviewed and are negative.      Objective   There were no vitals taken for this visit.    Physical Exam  Vitals and nursing note reviewed.   Constitutional:       General: She is not in acute distress.     Appearance: She is well-developed. She is obese.   HENT:      Head: Normocephalic and atraumatic.   Eyes:      Conjunctiva/sclera: Conjunctivae normal.   Cardiovascular:      Rate and Rhythm: Normal rate and regular rhythm.      Heart sounds: No murmur heard.  Pulmonary:      Effort: Pulmonary effort is normal. No respiratory distress.      Breath sounds: Normal breath sounds.   Abdominal:      Palpations: Abdomen is soft.      Tenderness: There is no abdominal tenderness.   Musculoskeletal:         General: No swelling.      Cervical back: Neck supple.   Skin:     General: Skin is warm and dry.      Capillary Refill: Capillary refill takes less than 2 seconds.   Neurological:      Mental Status: She is alert.   Psychiatric:         Mood and Affect: Mood normal.         Behavior: Behavior is agitated.         Visit Time  Total Visit Duration: 33 minutes

## 2024-12-12 NOTE — ASSESSMENT & PLAN NOTE
Increased since father's death anniversary is coming up. FMLA forms filled out. Wellbutrin gave patient palpitations so she stopped taking it.patient will follow up with Mental health services. They could not see her until February so she will look for someone in the area.  Patient has been calling around and is on many waiting lists.

## 2025-01-07 ENCOUNTER — OFFICE VISIT (OUTPATIENT)
Age: 25
End: 2025-01-07
Payer: COMMERCIAL

## 2025-01-07 VITALS
HEIGHT: 62 IN | WEIGHT: 183.4 LBS | OXYGEN SATURATION: 98 % | DIASTOLIC BLOOD PRESSURE: 73 MMHG | BODY MASS INDEX: 33.75 KG/M2 | SYSTOLIC BLOOD PRESSURE: 122 MMHG | HEART RATE: 93 BPM

## 2025-01-07 DIAGNOSIS — J30.1 SEASONAL ALLERGIC RHINITIS DUE TO POLLEN: ICD-10-CM

## 2025-01-07 DIAGNOSIS — J45.20 MILD INTERMITTENT ASTHMA, UNSPECIFIED WHETHER COMPLICATED: Primary | ICD-10-CM

## 2025-01-07 DIAGNOSIS — F41.9 ANXIETY: ICD-10-CM

## 2025-01-07 PROBLEM — E66.811 CLASS 1 OBESITY DUE TO EXCESS CALORIES WITHOUT SERIOUS COMORBIDITY WITH BODY MASS INDEX (BMI) OF 34.0 TO 34.9 IN ADULT: Status: RESOLVED | Noted: 2024-06-18 | Resolved: 2025-01-07

## 2025-01-07 PROBLEM — E66.09 CLASS 1 OBESITY DUE TO EXCESS CALORIES WITHOUT SERIOUS COMORBIDITY WITH BODY MASS INDEX (BMI) OF 34.0 TO 34.9 IN ADULT: Status: RESOLVED | Noted: 2024-06-18 | Resolved: 2025-01-07

## 2025-01-07 PROCEDURE — 99214 OFFICE O/P EST MOD 30 MIN: CPT | Performed by: FAMILY MEDICINE

## 2025-01-07 RX ORDER — ALBUTEROL SULFATE 90 UG/1
2 INHALANT RESPIRATORY (INHALATION) EVERY 6 HOURS PRN
Qty: 6.7 G | Refills: 1 | Status: SHIPPED | OUTPATIENT
Start: 2025-01-07

## 2025-01-07 RX ORDER — FLUTICASONE PROPIONATE 50 MCG
1 SPRAY, SUSPENSION (ML) NASAL DAILY
Qty: 9.9 ML | Refills: 1 | Status: SHIPPED | OUTPATIENT
Start: 2025-01-07

## 2025-01-07 RX ORDER — TIRZEPATIDE 5 MG/.5ML
5 INJECTION, SOLUTION SUBCUTANEOUS WEEKLY
Qty: 2 ML | Refills: 0 | Status: SHIPPED | OUTPATIENT
Start: 2025-01-07

## 2025-01-07 RX ORDER — LORATADINE 10 MG/1
10 TABLET ORAL DAILY
Qty: 30 TABLET | Refills: 0 | Status: SHIPPED | OUTPATIENT
Start: 2025-01-07

## 2025-01-07 NOTE — ASSESSMENT & PLAN NOTE
Loratidine and eye drops.    Orders:    loratadine (CLARITIN) 10 mg tablet; Take 1 tablet (10 mg total) by mouth daily    fluticasone (FLONASE) 50 mcg/act nasal spray; 1 spray into each nostril daily

## 2025-01-07 NOTE — ASSESSMENT & PLAN NOTE
Controlled with Albuterol                Orders:    albuterol (ProAir HFA) 90 mcg/act inhaler; Inhale 2 puffs every 6 (six) hours as needed for wheezing

## 2025-01-07 NOTE — PROGRESS NOTES
Name: Dennise Preston      : 2000      MRN: [id number]  Encounter Provider: AUTUMN Norwood  Encounter Date: 2025   Encounter department: [facility] PRIMARY CARE  :  Assessment & Plan  BMI 34.0-34.9,adult    Orders:    tirzepatide (Zepbound) 5 mg/0.5 mL auto-injector; Inject 0.5 mL (5 mg total) under the skin once a week    Mild intermittent asthma, unspecified whether complicated  Controlled with Albuterol                Orders:    albuterol (ProAir HFA) 90 mcg/act inhaler; Inhale 2 puffs every 6 (six) hours as needed for wheezing    Seasonal allergic rhinitis due to pollen  Loratidine and eye drops.    Orders:    loratadine (CLARITIN) 10 mg tablet; Take 1 tablet (10 mg total) by mouth daily    fluticasone (FLONASE) 50 mcg/act nasal spray; 1 spray into each nostril daily    Anxiety  Increased since father's death anniversary is coming up. FMLA forms filled out. Wellbutrin gave patient palpitations so she stopped taking it.patient will follow up with Mental health services. They could not see her until February so she will look for someone in the area.  Patient has been calling around and is on many waiting lists.          BMI 33.0-33.9,adult  Patient has lost 13 pounds since she started taking Zepbound                History of Present Illness     Patient is here for a follow up. Ever since she got on the Zepbound her stools have been off and on constipation and diarrhea. Patient is still suffering from anxiety and depression. Especially has been hard around the holidays and when she is around people.  When she is around people she gets very anxious, gets numbness in her fingers, diaphoresis and feel like she is going to pass out. 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  She feels like she is out of air and is very claustrophobic. Patient is still trying to get a hold of therapist. She is on many waiting lists.  They usually don't take new patients or  "don't take her insurance. Patient tried Wellbutrin but made her anxiety even worse. She was freaking out and felt like her chest was going to \"jump out of her chest\". So we stopped it right away. Patient is afraid to try another medication because she is very sensitive.      Review of Systems   Constitutional:  Negative for chills and fever.   HENT:  Negative for ear pain and sore throat.    Eyes:  Positive for visual disturbance. Negative for pain.   Respiratory:  Negative for cough and shortness of breath.    Cardiovascular:  Positive for palpitations. Negative for chest pain.   Gastrointestinal:  Negative for abdominal pain and vomiting.   Genitourinary:  Negative for dysuria and hematuria.   Musculoskeletal:  Negative for arthralgias and back pain.   Skin:  Negative for color change and rash.   Neurological:  Negative for seizures and syncope.   Psychiatric/Behavioral:  Positive for decreased concentration, dysphoric mood and sleep disturbance. The patient is nervous/anxious.         Phobia of tight spaces and too many people.   All other systems reviewed and are negative.      Objective   /73 (BP Location: Left arm, Patient Position: Sitting, Cuff Size: Standard)   Pulse 93   Ht 5' 2\" (1.575 m)   Wt 83.2 kg (183 lb 6.4 oz)   SpO2 98%   BMI 33.54 kg/m²      Physical Exam  Vitals and nursing note reviewed.   Constitutional:       General: She is not in acute distress.     Appearance: She is well-developed.      Comments: Patient appears anxious. Constantly fidgeting.   HENT:      Head: Normocephalic and atraumatic.   Eyes:      Conjunctiva/sclera: Conjunctivae normal.   Cardiovascular:      Rate and Rhythm: Normal rate and regular rhythm.      Heart sounds: No murmur heard.  Pulmonary:      Effort: Pulmonary effort is normal. No respiratory distress.      Breath sounds: Normal breath sounds.   Abdominal:      Palpations: Abdomen is soft.      Tenderness: There is no abdominal tenderness. "   Musculoskeletal:         General: No swelling.      Cervical back: Neck supple.   Skin:     General: Skin is warm and dry.      Capillary Refill: Capillary refill takes less than 2 seconds.   Psychiatric:         Mood and Affect: Mood is anxious.         Speech: Speech is rapid and pressured.

## 2025-01-09 ENCOUNTER — TELEPHONE (OUTPATIENT)
Age: 25
End: 2025-01-09

## 2025-01-14 ENCOUNTER — NURSE TRIAGE (OUTPATIENT)
Age: 25
End: 2025-01-14

## 2025-01-14 DIAGNOSIS — R10.84 GENERALIZED ABDOMINAL PAIN: Primary | ICD-10-CM

## 2025-01-14 NOTE — TELEPHONE ENCOUNTER
Patient notified, [facility] labs done at a [facility] lab.

## 2025-01-14 NOTE — TELEPHONE ENCOUNTER
"Patient contacted the office this morning. C/o upper abdominal pain, back pain which started last night. Was tossing and turning overnight, thought she was constipated, took a bath which helped with the pain but once out felt the pain again. Current pain is a 5, did take Tylenol. Last night it was an 8 -8.5. She did vomit to see if that would help with the discomfort, has nothing in stomach but feels nausea. Feels the stomach pain when there is pressure placed in that area. Feels stomach is more bloated. Was having pain in the stomach area couple weeks ago, thought it could be related to taking  tirzepatide (Zepbound) 5 mg/0.5 mL auto-injector but the pain was not this bad weeks ago when mentioning to pcp. She read online that while on the medication, could cause inflammation of the pancreas, unsure if this could be a reason. Wanting to know if pcp would be willing to order blood work and imaging to further assess. Or if an appt is needed in the office, did offer this afternoon but wants to know what pcp would recommend. I did instruct if symptoms would worsen to report to an UC/ED to be further evaluated. Patient verbalized understanding.     Reason for Disposition   MILD TO MODERATE constant pain lasting > 2 hours    Answer Assessment - Initial Assessment Questions  1. LOCATION: \"Where does it hurt?\"       Upper abdominal area (middle)  2. RADIATION: \"Does the pain shoot anywhere else?\" (e.g., chest, back)      Pain in upper abdomen, and back pain  3. ONSET: \"When did the pain begin?\" (e.g., minutes, hours or days ago)       Yesterday  4. SUDDEN: \"Gradual or sudden onset?\"      sudden  5. PATTERN \"Does the pain come and go, or is it constant?\"      Constant feeling   6. SEVERITY: \"How bad is the pain?\"  (e.g., Scale 1-10; mild, moderate, or severe)      Currently 5  7. RECURRENT SYMPTOM: \"Have you ever had this type of stomach pain before?\" If Yes, ask: \"When was the last time?\" and \"What happened that time?\"      " " Had stomach pain taking Zepbound, not this bad though  8. CAUSE: \"What do you think is causing the stomach pain?\"      Unsure if it's Zepbound(read could be inflammation of the pancreas)   9. RELIEVING/AGGRAVATING FACTORS: \"What makes it better or worse?\" (e.g., antacids, bending or twisting motion, bowel movement)      Took a bath last night, helped but pain instantly came back  10. OTHER SYMPTOMS: \"Do you have any other symptoms?\" (e.g., back pain, diarrhea, fever, urination pain, vomiting)        Back pain, little diarrhea  11. PREGNANCY: \"Is there any chance you are pregnant?\" \"When was your last menstrual period?\"        Didn't ask    Protocols used: Abdominal Pain - Female-Adult-[place]    "

## 2025-01-16 ENCOUNTER — APPOINTMENT (OUTPATIENT)
Age: 25
End: 2025-01-16
Payer: COMMERCIAL

## 2025-01-16 DIAGNOSIS — R10.84 GENERALIZED ABDOMINAL PAIN: ICD-10-CM

## 2025-01-16 LAB
BASOPHILS # BLD AUTO: 0.04 THOUSANDS/ΜL (ref 0–0.1)
BASOPHILS NFR BLD AUTO: 0 % (ref 0–1)
EOSINOPHIL # BLD AUTO: 0.49 THOUSAND/ΜL (ref 0–0.61)
EOSINOPHIL NFR BLD AUTO: 5 % (ref 0–6)
ERYTHROCYTE [DISTWIDTH] IN BLOOD BY AUTOMATED COUNT: 14.3 % (ref 11.6–15.1)
HCT VFR BLD AUTO: 39.6 % (ref 34.8–46.1)
HGB BLD-MCNC: 12.9 G/DL (ref 11.5–15.4)
IMM GRANULOCYTES # BLD AUTO: 0.01 THOUSAND/UL (ref 0–0.2)
IMM GRANULOCYTES NFR BLD AUTO: 0 % (ref 0–2)
LYMPHOCYTES # BLD AUTO: 2.5 THOUSANDS/ΜL (ref 0.6–4.47)
LYMPHOCYTES NFR BLD AUTO: 28 % (ref 14–44)
MCH RBC QN AUTO: 27.6 PG (ref 26.8–34.3)
MCHC RBC AUTO-ENTMCNC: 32.6 G/DL (ref 31.4–37.4)
MCV RBC AUTO: 85 FL (ref 82–98)
MONOCYTES # BLD AUTO: 0.5 THOUSAND/ΜL (ref 0.17–1.22)
MONOCYTES NFR BLD AUTO: 6 % (ref 4–12)
NEUTROPHILS # BLD AUTO: 5.46 THOUSANDS/ΜL (ref 1.85–7.62)
NEUTS SEG NFR BLD AUTO: 61 % (ref 43–75)
NRBC BLD AUTO-RTO: 0 /100 WBCS
PLATELET # BLD AUTO: 433 THOUSANDS/UL (ref 149–390)
PMV BLD AUTO: 9.8 FL (ref 8.9–12.7)
RBC # BLD AUTO: 4.67 MILLION/UL (ref 3.81–5.12)
WBC # BLD AUTO: 9 THOUSAND/UL (ref 4.31–10.16)

## 2025-01-16 PROCEDURE — 36415 COLL VENOUS BLD VENIPUNCTURE: CPT

## 2025-01-16 PROCEDURE — 80053 COMPREHEN METABOLIC PANEL: CPT

## 2025-01-16 PROCEDURE — 83690 ASSAY OF LIPASE: CPT

## 2025-01-16 PROCEDURE — 85025 COMPLETE CBC W/AUTO DIFF WBC: CPT

## 2025-01-16 PROCEDURE — 82150 ASSAY OF AMYLASE: CPT

## 2025-01-17 LAB
ALBUMIN SERPL BCG-MCNC: 4.4 G/DL (ref 3.5–5)
ALP SERPL-CCNC: 49 U/L (ref 34–104)
ALT SERPL W P-5'-P-CCNC: 13 U/L (ref 7–52)
AMYLASE SERPL-CCNC: 32 IU/L (ref 29–103)
ANION GAP SERPL CALCULATED.3IONS-SCNC: 11 MMOL/L (ref 4–13)
AST SERPL W P-5'-P-CCNC: 16 U/L (ref 13–39)
BILIRUB SERPL-MCNC: 0.35 MG/DL (ref 0.2–1)
BUN SERPL-MCNC: 13 MG/DL (ref 5–25)
CALCIUM SERPL-MCNC: 8.2 MG/DL (ref 8.4–10.2)
CHLORIDE SERPL-SCNC: 104 MMOL/L (ref 96–108)
CO2 SERPL-SCNC: 24 MMOL/L (ref 21–32)
CREAT SERPL-MCNC: 0.61 MG/DL (ref 0.6–1.3)
GFR SERPL CREATININE-BSD FRML MDRD: 126 ML/MIN/1.73SQ M
GLUCOSE P FAST SERPL-MCNC: 70 MG/DL (ref 65–99)
LIPASE SERPL-CCNC: 14 U/L (ref 11–82)
POTASSIUM SERPL-SCNC: 3.8 MMOL/L (ref 3.5–5.3)
PROT SERPL-MCNC: 8 G/DL (ref 6.4–8.4)
SODIUM SERPL-SCNC: 139 MMOL/L (ref 135–147)

## 2025-02-04 ENCOUNTER — OFFICE VISIT (OUTPATIENT)
Age: 25
End: 2025-02-04
Payer: COMMERCIAL

## 2025-02-04 VITALS
SYSTOLIC BLOOD PRESSURE: 118 MMHG | BODY MASS INDEX: 33.79 KG/M2 | HEIGHT: 62 IN | DIASTOLIC BLOOD PRESSURE: 72 MMHG | OXYGEN SATURATION: 99 % | WEIGHT: 183.6 LBS | HEART RATE: 92 BPM

## 2025-02-04 DIAGNOSIS — F41.9 ANXIETY: ICD-10-CM

## 2025-02-04 DIAGNOSIS — J30.1 SEASONAL ALLERGIC RHINITIS DUE TO POLLEN: ICD-10-CM

## 2025-02-04 DIAGNOSIS — J45.20 MILD INTERMITTENT ASTHMA, UNSPECIFIED WHETHER COMPLICATED: Primary | ICD-10-CM

## 2025-02-04 DIAGNOSIS — F33.1 MODERATE EPISODE OF RECURRENT MAJOR DEPRESSIVE DISORDER (HCC): ICD-10-CM

## 2025-02-04 PROCEDURE — 99214 OFFICE O/P EST MOD 30 MIN: CPT | Performed by: FAMILY MEDICINE

## 2025-02-04 NOTE — PROGRESS NOTES
"Name: Dennise Preston      : 2000      MRN: [id number]  Encounter Provider: AUTUMN Norwood  Encounter Date: 2025   Encounter department: [facility] PRIMARY CARE  :  Assessment & Plan           History of Present Illness   Patient here for a follow up. She has lost about 20 lbs since she started  Tirzepatide. Patient has been feeling better since she has changed her diet. and  Patient is still suffering from anxiety and depression. Especially has been hard around the holidays and when she is around people.  When she is around people she gets very anxious, gets numbness in her fingers, diaphoresis and feel like she is going to pass out. 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  She feels like she is out of air and is very claustrophobic. Patient is still trying to get a hold of therapist. She is on many waiting lists.  They usually don't take new patients or don't take her insurance. Patient tried Wellbutrin but made her anxiety even worse. She was freaking out and felt like her chest was going to \"jump out of her chest\". So we stopped it right away. Patient is afraid to try another medication because she is very sensitive.         Review of Systems   Constitutional:  Negative for chills and fever.   HENT:  Negative for ear pain and sore throat.    Eyes:  Negative for pain and visual disturbance.   Respiratory:  Negative for cough and shortness of breath.    Cardiovascular:  Positive for palpitations. Negative for chest pain.   Gastrointestinal:  Negative for abdominal pain and vomiting.   Genitourinary:  Negative for dysuria and hematuria.   Musculoskeletal:  Negative for arthralgias and back pain.   Skin:  Negative for color change and rash.   Neurological:  Positive for dizziness. Negative for seizures and syncope.   Psychiatric/Behavioral:  Positive for sleep disturbance. The patient is nervous/anxious (Worse when around people and in tight areas.).  " "  All other systems reviewed and are negative.      Objective   /72 (BP Location: Left arm, Patient Position: Sitting, Cuff Size: Standard)   Pulse 92   Ht 5' 2\" (1.575 m)   Wt 83.3 kg (183 lb 9.6 oz)   SpO2 99%   BMI 33.58 kg/m²      Physical Exam  Vitals and nursing note reviewed.   Constitutional:       General: She is not in acute distress.     Appearance: She is well-developed.   HENT:      Head: Normocephalic and atraumatic.   Eyes:      Conjunctiva/sclera: Conjunctivae normal.   Cardiovascular:      Rate and Rhythm: Normal rate and regular rhythm.      Heart sounds: No murmur heard.  Pulmonary:      Effort: Pulmonary effort is normal. No respiratory distress.      Breath sounds: Normal breath sounds.   Abdominal:      Palpations: Abdomen is soft.      Tenderness: There is no abdominal tenderness.   Musculoskeletal:         General: No swelling.      Cervical back: Neck supple.   Skin:     General: Skin is warm and dry.      Capillary Refill: Capillary refill takes less than 2 seconds.   Neurological:      Mental Status: She is alert and oriented to person, place, and time.   Psychiatric:         Mood and Affect: Mood is anxious.       "

## 2025-02-04 NOTE — LETTER
February 4, 2025     Patient: Dennise Preston  YOB: 2000  Date of Visit: 2/4/2025      To Whom it May Concern:    Dennise Preston is under my professional care. Dennise was seen in my office on 2/4/2025. Dennise     I would recommend that Dennise Preston is allowed 1 day of intermittent leave a week as needed. Please allow her to work split 5 hour shifts. This is due to her severe anxiety and panic attacks that we are trying to reduce, with start date being 2/27/25 and an end date of 8/27/25/    If you have any questions or concerns, please don't hesitate to call.         Sincerely,          AUTUMN Norwood        CC: No Recipients

## 2025-02-04 NOTE — ASSESSMENT & PLAN NOTE
Patient tried Wellbutrin but made her even more anxious. She heard from [facility] and was told she can not be seen until February. She is looking for another Mental health provider in the area. Will extend leave for now until she gets appropriate help

## 2025-02-10 ENCOUNTER — TELEPHONE (OUTPATIENT)
Age: 25
End: 2025-02-10

## 2025-02-10 RX ORDER — TIRZEPATIDE 7.5 MG/.5ML
7.5 INJECTION, SOLUTION SUBCUTANEOUS WEEKLY
Qty: 2 ML | Refills: 0 | Status: SHIPPED | OUTPATIENT
Start: 2025-02-10

## 2025-02-10 NOTE — TELEPHONE ENCOUNTER
Pt needs to step up to the zepbound 7.5mg. please send to [facility]. Pre auth request in separate note.

## 2025-02-10 NOTE — TELEPHONE ENCOUNTER
Reason for call:   [x] Prior Auth  [] Other:     Caller:  [x] Patient  [] Pharmacy  Name:   Address:   Callback Number:     Medication: zepbound 7.5mg    Dose/Frequency:     Quantity:     Ordering Provider:   [] PCP/Provider -   [] Speciality/Provider -     Has the patient tried other medications and failed? If failed, which medications did they fail?    [] No   [] Yes -     Is the patient's insurance updated in EPIC?   [] Yes   [] No     Is a copy of the patient's insurance scanned in EPIC?   [] Yes   [] No       no

## 2025-02-13 NOTE — TELEPHONE ENCOUNTER
PA for Zepbound 7.5mg CANCELLED due to     [x]Approval on file-dates approved 11/18/2024-5/18/2025  (ALL STRENGTHS WERE APPROVED)    []Medication already on Formulary  []Brand Name Preferred  []Patient no longer covered by insurance    Patient advised by     [x]My Chart Message  []Phone call    Message sent to office clinical pool   Yes    Scanned into Media  no

## 2025-02-25 ENCOUNTER — OFFICE VISIT (OUTPATIENT)
Age: 25
End: 2025-02-25
Payer: COMMERCIAL

## 2025-02-25 VITALS
WEIGHT: 175 LBS | HEART RATE: 83 BPM | DIASTOLIC BLOOD PRESSURE: 69 MMHG | HEIGHT: 62 IN | OXYGEN SATURATION: 98 % | SYSTOLIC BLOOD PRESSURE: 123 MMHG | BODY MASS INDEX: 32.2 KG/M2

## 2025-02-25 DIAGNOSIS — J45.20 MILD INTERMITTENT ASTHMA, UNSPECIFIED WHETHER COMPLICATED: ICD-10-CM

## 2025-02-25 DIAGNOSIS — F33.1 MODERATE EPISODE OF RECURRENT MAJOR DEPRESSIVE DISORDER (HCC): ICD-10-CM

## 2025-02-25 DIAGNOSIS — F41.9 ANXIETY: Primary | ICD-10-CM

## 2025-02-25 PROCEDURE — 99213 OFFICE O/P EST LOW 20 MIN: CPT | Performed by: FAMILY MEDICINE

## 2025-02-25 NOTE — LETTER
February 27, 2025     Patient: Dennise Preston  YOB: 2000  Date of Visit: 2/25/2025      To Whom it May Concern:    Dennise Preston is under my professional care. Dennise was seen in my office on 2/25/2025. I would recommend that Dennise Preston is allowed 1 day of intermittent leave a week as needed. Please allow her to work 5 hour shifts. This is due to her  severe anxiety and panic attacks that we are trying to reduce, with start date being 03/12/2025 and an end date of 9/27/2025.    If you have any questions or concerns, please don't hesitate to call.         Sincerely,          AUTUMN Norwood        CC: No Recipients

## 2025-02-25 NOTE — PROGRESS NOTES
"Name: Dennise Preston      : 2000      MRN: [id number]  Encounter Provider: AUTUMN Norwood  Encounter Date: 2025   Encounter department: [facility] PRIMARY CARE  :  Assessment & Plan  Anxiety  Increased since father's death anniversary is coming up. FMLA forms filled out. Wellbutrin gave patient palpitations so she stopped taking it.patient will follow up with Mental health services. They could not see her until February so she will look for someone in the area.  Patient has been calling around and is on many waiting lists.             Moderate episode of recurrent major depressive disorder (HCC)  Patient tried Wellbutrin but made her even more anxious. She heard from [facility] and was told she can not be seen until February. She is looking for another Mental health provider in the area. Will extend leave for now until she gets appropriate help            Mild intermittent asthma, unspecified whether complicated  Controlled with Albuterol                 History of Present Illness   Patient here for a follow up.    Patient is still suffering from anxiety and depression. Especially has been hard around the holidays and when she is around people.  When she is around people she gets very anxious, gets numbness in her fingers, diaphoresis and feel like she is going to pass out. 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  She feels like she is out of air and is very claustrophobic. Patient is still trying to get a hold of therapist. She is on many waiting lists.  They usually don't take new patients or don't take her insurance. Patient tried Wellbutrin but made her anxiety even worse. She was freaking out and felt like her chest was going to \"jump out of her chest\". So we stopped it right away. Patient is afraid to try another medication because she is very sensitive.She has lost about 20 lbs since she started  Tirzepatide. Patient has been feeling better " "since she has changed her diet.      Review of Systems   Constitutional:  Negative for chills and fever.   HENT:  Negative for ear pain and sore throat.    Eyes:  Negative for pain and visual disturbance.   Respiratory:  Negative for cough and shortness of breath.    Cardiovascular:  Positive for palpitations. Negative for chest pain.   Gastrointestinal:  Negative for abdominal pain and vomiting.   Genitourinary:  Negative for dysuria and hematuria.   Musculoskeletal:  Negative for arthralgias and back pain.   Skin:  Negative for color change and rash.   Neurological:  Negative for seizures and syncope.   Psychiatric/Behavioral:  Positive for decreased concentration and sleep disturbance. The patient is nervous/anxious.    All other systems reviewed and are negative.      Objective   /69 (BP Location: Left arm, Patient Position: Sitting, Cuff Size: Standard)   Pulse 83   Ht 5' 2\" (1.575 m)   Wt 79.4 kg (175 lb)   SpO2 98%   BMI 32.01 kg/m²      Physical Exam  Vitals and nursing note reviewed.   Constitutional:       General: She is not in acute distress.     Appearance: She is well-developed. She is obese.   HENT:      Head: Normocephalic and atraumatic.   Eyes:      Conjunctiva/sclera: Conjunctivae normal.   Cardiovascular:      Rate and Rhythm: Normal rate and regular rhythm.      Heart sounds: No murmur heard.  Pulmonary:      Effort: Pulmonary effort is normal. No respiratory distress.      Breath sounds: Normal breath sounds.   Abdominal:      Palpations: Abdomen is soft.      Tenderness: There is no abdominal tenderness.   Musculoskeletal:         General: No swelling.      Cervical back: Neck supple.   Skin:     General: Skin is warm and dry.      Capillary Refill: Capillary refill takes less than 2 seconds.   Neurological:      Mental Status: She is alert.   Psychiatric:         Mood and Affect: Mood is anxious. Affect is tearful.         "

## 2025-02-27 NOTE — ASSESSMENT & PLAN NOTE
Patient tried Wellbutrin but made her even more anxious. She heard from [facility] and was told she can not be seen until February. She is looking for another Mental health provider in the area. Will extend leave for now until she gets appropriate help

## 2025-03-11 RX ORDER — TIRZEPATIDE 5 MG/.5ML
5 INJECTION, SOLUTION SUBCUTANEOUS WEEKLY
Qty: 2 ML | Refills: 0 | Status: SHIPPED | OUTPATIENT
Start: 2025-03-11

## 2025-04-08 ENCOUNTER — OFFICE VISIT (OUTPATIENT)
Age: 25
End: 2025-04-08
Payer: COMMERCIAL

## 2025-04-08 ENCOUNTER — PATIENT MESSAGE (OUTPATIENT)
Age: 25
End: 2025-04-08

## 2025-04-08 ENCOUNTER — TELEPHONE (OUTPATIENT)
Dept: ADMINISTRATIVE | Facility: OTHER | Age: 25
End: 2025-04-08

## 2025-04-08 VITALS
WEIGHT: 167 LBS | HEIGHT: 62 IN | BODY MASS INDEX: 30.73 KG/M2 | SYSTOLIC BLOOD PRESSURE: 116 MMHG | HEART RATE: 63 BPM | DIASTOLIC BLOOD PRESSURE: 72 MMHG | OXYGEN SATURATION: 98 %

## 2025-04-08 DIAGNOSIS — E66.9 OBESITY (BMI 30-39.9): ICD-10-CM

## 2025-04-08 DIAGNOSIS — F41.9 ANXIETY: ICD-10-CM

## 2025-04-08 DIAGNOSIS — R79.89 HIGH PLATELET COUNT: ICD-10-CM

## 2025-04-08 DIAGNOSIS — F33.1 MODERATE EPISODE OF RECURRENT MAJOR DEPRESSIVE DISORDER (HCC): ICD-10-CM

## 2025-04-08 DIAGNOSIS — J30.1 SEASONAL ALLERGIC RHINITIS DUE TO POLLEN: ICD-10-CM

## 2025-04-08 DIAGNOSIS — J45.20 MILD INTERMITTENT ASTHMA, UNSPECIFIED WHETHER COMPLICATED: Primary | ICD-10-CM

## 2025-04-08 PROCEDURE — 99213 OFFICE O/P EST LOW 20 MIN: CPT | Performed by: FAMILY MEDICINE

## 2025-04-08 RX ORDER — ADAPALENE GEL USP, 0.3% 3 MG/G
GEL TOPICAL
COMMUNITY
Start: 2025-03-21

## 2025-04-08 RX ORDER — DOXYCYCLINE 100 MG/1
CAPSULE ORAL
COMMUNITY
Start: 2025-03-17

## 2025-04-08 RX ORDER — CLINDAMYCIN PHOSPHATE 10 UG/ML
LOTION TOPICAL
COMMUNITY
Start: 2025-03-17

## 2025-04-08 NOTE — ASSESSMENT & PLAN NOTE
Patient tried Wellbutrin but made her even more anxious. She heard from [facility] and was told she can not be seen until February. She is looking for another Mental health provider in the area. Will extend leave for now until she gets appropriate help

## 2025-04-08 NOTE — TELEPHONE ENCOUNTER
----- Message from Brenda FLORES sent at 4/8/2025  9:17 AM EDT -----  Regarding: Care Gap Request  Pap Smear  04/08/25 9:17 AM    Hello, our patient Dennise Preston has had Pap Smear (HPV) aka Cervical Cancer Screening completed/performed. Please assist in updating the patient chart by pulling the Care Everywhere (CE) document. The date of service is 3/04/22.     Thank you,  Brenda Wood MA  [facility] PRIMARY CARE

## 2025-04-08 NOTE — PROGRESS NOTES
Name: Dennise Preston      : 2000      MRN: [id number]  Encounter Provider: AUTUMN Norwood  Encounter Date: 2025   Encounter department: [facility] PRIMARY CARE  :  Assessment & Plan  Mild intermittent asthma, unspecified whether complicated  Controlled with Albuterol                Seasonal allergic rhinitis due to pollen  Loratidine and eye drops.          Anxiety  Increased since father's death anniversary is coming up. FMLA forms filled out. Wellbutrin gave patient palpitations so she stopped taking it.patient will follow up with Mental health services. They could not see her until February so she will look for someone in the area.  Patient has been calling around and is on many waiting lists.                Moderate episode of recurrent major depressive disorder (HCC)  Patient tried Wellbutrin but made her even more anxious. She heard from [facility] and was told she can not be seen until February. She is looking for another Mental health provider in the area. Will extend leave for now until she gets appropriate help               High platelet count  Iron panel ordered. Low iron saturation. Ferrous sulfate ordered.               Obesity (BMI 30-39.9)  Patient has lost 20 lbs with Zepbound.                History of Present Illness   Patient here for a follow up.    Patient is still suffering from anxiety and depression. Especially is bad when she  is around people.  When she is around people she gets very anxious, gets numbness in her fingers, diaphoresis and feel like she is going to pass out. 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  She feels like she is out of air and is very claustrophobic. Patient is still trying to get a hold of therapist. She is on many waiting lists.  They usually don't take new patients or don't take her insurance. Patient tried Wellbutrin but made her anxiety even worse. She was freaking out and felt like her chest  Report received from Will RN. Assumed care of patient. A&O x4, resting comfortably in bed. Security sitter in place. No other needs at this time. Hourly rounding ongoing.    "was going to \"jump out of her chest\". So we stopped it right away. Patient is afraid to try another medication because she is very sensitive.She has lost about 20 lbs since she started  Tirzepatide. Patient has been feeling better since she has changed her diet.  Patient saw dermatology at [facility] and was given alotion, gel and oral antibiotic. She was told it might be because of her hormones.    Review of Systems   Constitutional:  Negative for chills and fever.   HENT:  Negative for ear pain and sore throat.    Eyes:  Negative for pain and visual disturbance.   Respiratory:  Negative for cough and shortness of breath.    Cardiovascular:  Positive for palpitations. Negative for chest pain.   Gastrointestinal:  Negative for abdominal pain and vomiting.   Genitourinary:  Negative for dysuria and hematuria.   Musculoskeletal:  Negative for arthralgias and back pain.   Skin:  Negative for color change and rash.   Neurological:  Negative for seizures and syncope.   Psychiatric/Behavioral:  Positive for decreased concentration and sleep disturbance. The patient is nervous/anxious.    All other systems reviewed and are negative.      Objective   /72 (BP Location: Left arm, Patient Position: Sitting, Cuff Size: Standard)   Pulse 63   Ht 5' 2\" (1.575 m)   Wt 75.8 kg (167 lb)   SpO2 98%   BMI 30.54 kg/m²      Physical Exam  Vitals and nursing note reviewed.   Constitutional:       General: She is not in acute distress.     Appearance: She is well-developed. She is obese.   HENT:      Head: Normocephalic and atraumatic.   Eyes:      Conjunctiva/sclera: Conjunctivae normal.   Cardiovascular:      Rate and Rhythm: Normal rate and regular rhythm.      Heart sounds: No murmur heard.  Pulmonary:      Effort: Pulmonary effort is normal. No respiratory distress.      Breath sounds: Normal breath sounds.   Abdominal:      Palpations: Abdomen is soft.      Tenderness: There is no abdominal tenderness. "   Musculoskeletal:         General: No swelling.      Cervical back: Neck supple.   Skin:     General: Skin is warm and dry.      Capillary Refill: Capillary refill takes less than 2 seconds.   Neurological:      Mental Status: She is alert.   Psychiatric:         Mood and Affect: Mood normal. Affect is tearful.

## 2025-04-08 NOTE — TELEPHONE ENCOUNTER
Upon review of the In Basket request we were able to locate, review, and update the patient chart as requested for Pap Smear (HPV) aka Cervical Cancer Screening.    Any additional questions or concerns should be emailed to the Practice Liaisons via the appropriate education email address, please do not reply via In Basket.    Thank you  Evi Blount [facility]

## 2025-04-09 ENCOUNTER — OFFICE VISIT (OUTPATIENT)
Age: 25
End: 2025-04-09
Payer: COMMERCIAL

## 2025-04-09 VITALS
HEIGHT: 62 IN | WEIGHT: 167 LBS | BODY MASS INDEX: 30.73 KG/M2 | SYSTOLIC BLOOD PRESSURE: 123 MMHG | OXYGEN SATURATION: 98 % | HEART RATE: 83 BPM | DIASTOLIC BLOOD PRESSURE: 72 MMHG

## 2025-04-09 DIAGNOSIS — F33.1 MODERATE EPISODE OF RECURRENT MAJOR DEPRESSIVE DISORDER (HCC): Primary | ICD-10-CM

## 2025-04-09 DIAGNOSIS — J30.1 SEASONAL ALLERGIC RHINITIS DUE TO POLLEN: ICD-10-CM

## 2025-04-09 DIAGNOSIS — F41.9 ANXIETY: ICD-10-CM

## 2025-04-09 PROCEDURE — 99213 OFFICE O/P EST LOW 20 MIN: CPT | Performed by: FAMILY MEDICINE

## 2025-04-09 RX ORDER — OLOPATADINE HYDROCHLORIDE 1 MG/ML
1 SOLUTION/ DROPS OPHTHALMIC 2 TIMES DAILY
Qty: 5 ML | Refills: 1 | Status: SHIPPED | OUTPATIENT
Start: 2025-04-09

## 2025-04-09 RX ORDER — TIRZEPATIDE 5 MG/.5ML
5 INJECTION, SOLUTION SUBCUTANEOUS WEEKLY
Qty: 2 ML | Refills: 0 | Status: SHIPPED | OUTPATIENT
Start: 2025-04-09

## 2025-04-09 NOTE — ASSESSMENT & PLAN NOTE
Increased since father's death anniversary is coming up. FMLA forms filled out. Wellbutrin gave patient palpitations so she stopped taking it.patient will follow up with Mental health services. They could not see her until February so she will look for someone in the area. Patient has been calling around and is on many waiting lists.  Made an appointment for May however has to pay out of pocket

## 2025-04-09 NOTE — PROGRESS NOTES
"Name: Dennise Preston      : 2000      MRN: [id number]  Encounter Provider: AUTUMN Norwood  Encounter Date: 2025   Encounter department: [facility] PRIMARY CARE  :  Assessment & Plan  Moderate episode of recurrent major depressive disorder (HCC)  Patient tried Wellbutrin but made her even more anxious. She heard from [facility] and was told she can not be seen until February. She is looking for another Mental health provider in the area. Will extend leave for now until she gets appropriate help          Anxiety  Increased since father's death anniversary is coming up. FMLA forms filled out. Wellbutrin gave patient palpitations so she stopped taking it.patient will follow up with Mental health services. They could not see her until February so she will look for someone in the area. Patient has been calling around and is on many waiting lists.  Made an appointment for May however has to pay out of pocket                History of Present Illness   Patient here for a follow up.    Patient is still suffering from anxiety and depression. Especially is bad when she  is around people.  When she is around people she gets very anxious, gets numbness in her fingers, diaphoresis and feel like she is going to pass out. 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  She feels like she is out of air and is very claustrophobic. Patient is still trying to get a hold of therapist. She is on many waiting lists.  They usually don't take new patients or don't take her insurance. Patient tried Wellbutrin but made her anxiety even worse. She was freaking out and felt like her chest was going to \"jump out of her chest\". So we stopped it right away. Patient is afraid to try another medication because she is very sensitive. Patient was finally able to make an appointment with a therapist out of her Network and will have to pay out of pocket but she just wants to get better and go " "back to work . Patient lost her dad when she was 12 and his birthday is approaching so she has been feeling more anxious than usual. Her symptoms get triggered aroun certain special dates and events that remind her of the past.       Review of Systems   Constitutional:  Negative for chills and fever.   HENT:  Negative for ear pain and sore throat.    Eyes:  Negative for pain and visual disturbance.   Respiratory:  Negative for cough and shortness of breath.    Cardiovascular:  Negative for chest pain and palpitations.   Gastrointestinal:  Negative for abdominal pain and vomiting.   Genitourinary:  Negative for dysuria and hematuria.   Musculoskeletal:  Negative for arthralgias and back pain.   Skin:  Negative for color change and rash.   Neurological:  Negative for seizures and syncope.   Psychiatric/Behavioral:  Positive for decreased concentration, dysphoric mood and sleep disturbance. The patient is nervous/anxious (Worse recently since late fater birthday is approaching).    All other systems reviewed and are negative.      Objective   /72 (BP Location: Left arm, Patient Position: Sitting, Cuff Size: Standard)   Pulse 83   Ht 5' 2\" (1.575 m)   Wt 75.8 kg (167 lb)   SpO2 98%   BMI 30.54 kg/m²      Physical Exam  Vitals and nursing note reviewed.   Constitutional:       General: She is not in acute distress.     Appearance: She is well-developed. She is obese.   HENT:      Head: Normocephalic and atraumatic.   Eyes:      Conjunctiva/sclera: Conjunctivae normal.   Cardiovascular:      Rate and Rhythm: Normal rate and regular rhythm.      Heart sounds: No murmur heard.  Pulmonary:      Effort: Pulmonary effort is normal. No respiratory distress.      Breath sounds: Normal breath sounds.   Abdominal:      Palpations: Abdomen is soft.      Tenderness: There is no abdominal tenderness.   Musculoskeletal:         General: No swelling.      Cervical back: Neck supple.   Skin:     General: Skin is warm and dry. "      Capillary Refill: Capillary refill takes less than 2 seconds.   Neurological:      Mental Status: She is alert.   Psychiatric:         Mood and Affect: Mood is anxious. Affect is tearful.         Behavior: Behavior is cooperative.

## 2025-04-09 NOTE — PATIENT COMMUNICATION
Patient called, confirmed today's appointment 4/9 @ 11:30 (30 minutes) with PCP (MR) - arrival time - 11:15. No call back needed.

## 2025-04-09 NOTE — ASSESSMENT & PLAN NOTE
Patient tried Wellbutrin but made her even more anxious. She heard from [facility] and was told she can not be seen until February. She is looking for another Mental health provider in the area. Will extend leave for now until she gets appropriate help

## 2025-04-11 ENCOUNTER — TELEPHONE (OUTPATIENT)
Age: 25
End: 2025-04-11

## 2025-04-11 NOTE — TELEPHONE ENCOUNTER
"Patient calls regarding a concern regarding neurological symptoms experienced yesterday. Patient was seen in the ED at [facility] due to stroke-like symptoms. Patient had a severe headache, loss of vision in her R eye, tingling in her hands and feet, difficulty with speech, difficulty thinking. CT scan negative. Patient was diagnosed with a complex migraine. Patient was asking to set up an ED follow up. I was able to schedule the patient for 4/15/25.  Patient commented that she has no symptoms today except that her R eye feels \"heavy\" and a little tingly. Patient denies pain, vision changes, weakness, speech or swallowing difficulties, change in mental status.   "

## 2025-04-15 ENCOUNTER — OFFICE VISIT (OUTPATIENT)
Age: 25
End: 2025-04-15
Payer: COMMERCIAL

## 2025-04-15 VITALS
SYSTOLIC BLOOD PRESSURE: 117 MMHG | WEIGHT: 167 LBS | HEART RATE: 85 BPM | BODY MASS INDEX: 30.73 KG/M2 | HEIGHT: 62 IN | DIASTOLIC BLOOD PRESSURE: 67 MMHG | OXYGEN SATURATION: 98 %

## 2025-04-15 DIAGNOSIS — G43.B0 OPHTHALMOPLEGIC MIGRAINE, NOT INTRACTABLE: Primary | ICD-10-CM

## 2025-04-15 DIAGNOSIS — H54.7 LOSS OF VISION: ICD-10-CM

## 2025-04-15 PROCEDURE — 99213 OFFICE O/P EST LOW 20 MIN: CPT | Performed by: FAMILY MEDICINE

## 2025-04-15 NOTE — PROGRESS NOTES
Name: Dennise Preston      : 2000      MRN: [id number]  Encounter Provider: AUTUMN Norwood  Encounter Date: 4/15/2025   Encounter department: [facility] PRIMARY CARE  :  Assessment & Plan  Ophthalmoplegic migraine, not intractable  Patient referred To Neurology. CT of brain was negative.  Orders:    Ambulatory Referral to Neurology; Future    Loss of vision  Patient had a stroke work up in [facility] ER and stroke was ruled out. Patient's vision has returned. Referred placed to Ophthalmology  Orders:    Ambulatory Referral to Ophthalmology; Future           History of Present Illness   Patient here for post ER follow up. Patient was seen at [facility] ER. Patient was driving and lost vision  in her right eye. She got home and called her mom. While she was on the phone she could not get words  out.  Then she had a panic attack so her mom took her to the hospital. On the way to the hospital she developed migraine. At the hospital she had stroke work up and everything was normal so she was told to see a Neurologist. Per patient her right eye was heavy. Since then she has had one  episode of headache and heaviness behind her right eye and nausea.     Review of Systems   Constitutional:  Negative for chills and fever.   HENT:  Negative for ear pain and sore throat.    Eyes:  Negative for pain and visual disturbance.   Respiratory:  Negative for cough and shortness of breath.    Cardiovascular:  Negative for chest pain and palpitations.   Gastrointestinal:  Negative for abdominal pain and vomiting.   Genitourinary:  Negative for dysuria and hematuria.   Musculoskeletal:  Negative for arthralgias and back pain.   Skin:  Negative for color change and rash.   Neurological:  Negative for seizures and syncope.   Psychiatric/Behavioral:  The patient is nervous/anxious.    All other systems reviewed and are negative.      Objective   /67 (BP Location: Left arm, Patient Position: Sitting, Cuff Size:  "Standard)   Pulse 85   Ht 5' 2\" (1.575 m)   Wt 75.8 kg (167 lb)   SpO2 98%   BMI 30.54 kg/m²      Physical Exam  Vitals and nursing note reviewed.   Constitutional:       General: She is not in acute distress.     Appearance: She is well-developed.   HENT:      Head: Normocephalic and atraumatic.   Eyes:      Conjunctiva/sclera: Conjunctivae normal.   Cardiovascular:      Rate and Rhythm: Normal rate and regular rhythm.      Heart sounds: No murmur heard.  Pulmonary:      Effort: Pulmonary effort is normal. No respiratory distress.      Breath sounds: Normal breath sounds.   Abdominal:      Palpations: Abdomen is soft.      Tenderness: There is no abdominal tenderness.   Musculoskeletal:         General: No swelling.      Cervical back: Neck supple.   Skin:     General: Skin is warm and dry.      Capillary Refill: Capillary refill takes less than 2 seconds.   Neurological:      Mental Status: She is alert.   Psychiatric:         Mood and Affect: Mood normal.         "

## 2025-04-28 ENCOUNTER — TELEPHONE (OUTPATIENT)
Age: 25
End: 2025-04-28

## 2025-04-28 NOTE — TELEPHONE ENCOUNTER
[facility] called in and wanted to know if we received the request for records for the patient - advised that he would have to reach out to the records department.

## 2025-05-01 NOTE — TELEPHONE ENCOUNTER
Jeanine branch/[employer] called requesting confirmation that we received a medical records request faxed on 4/23 and 4/29.     Based on prior encounter - [employer] was provided with the fax# for Medical Records.    Advised Jeanine that he would need to follow up with Medical Records to confirm that they received the faxed medical records request.    Provided Jeanine with the contact number for Medical Records.   normal balance

## 2025-05-12 ENCOUNTER — TELEPHONE (OUTPATIENT)
Age: 25
End: 2025-05-12

## 2025-05-12 NOTE — TELEPHONE ENCOUNTER
LMOM notifying patient of need for alternative pharmacy for zepbound prescription, requested a call back with an update.    Notified patient of results via [facility].   Raghav Shaffer CMA (AAMA) 6/30/2017 10:00 AM

## 2025-06-25 ENCOUNTER — APPOINTMENT (OUTPATIENT)
Age: 25
End: 2025-06-25
Payer: COMMERCIAL

## 2025-06-25 ENCOUNTER — OFFICE VISIT (OUTPATIENT)
Age: 25
End: 2025-06-25
Payer: COMMERCIAL

## 2025-06-25 VITALS
BODY MASS INDEX: 32.31 KG/M2 | WEIGHT: 175.6 LBS | HEART RATE: 74 BPM | OXYGEN SATURATION: 98 % | SYSTOLIC BLOOD PRESSURE: 112 MMHG | HEIGHT: 62 IN | DIASTOLIC BLOOD PRESSURE: 72 MMHG

## 2025-06-25 DIAGNOSIS — R11.0 NAUSEA: ICD-10-CM

## 2025-06-25 DIAGNOSIS — E66.9 OBESITY (BMI 30-39.9): ICD-10-CM

## 2025-06-25 DIAGNOSIS — M54.50 ACUTE MIDLINE LOW BACK PAIN WITHOUT SCIATICA: ICD-10-CM

## 2025-06-25 DIAGNOSIS — J45.20 MILD INTERMITTENT ASTHMA, UNSPECIFIED WHETHER COMPLICATED: ICD-10-CM

## 2025-06-25 DIAGNOSIS — F33.1 MODERATE EPISODE OF RECURRENT MAJOR DEPRESSIVE DISORDER (HCC): ICD-10-CM

## 2025-06-25 DIAGNOSIS — F41.9 ANXIETY: ICD-10-CM

## 2025-06-25 DIAGNOSIS — J30.1 SEASONAL ALLERGIC RHINITIS DUE TO POLLEN: ICD-10-CM

## 2025-06-25 DIAGNOSIS — Z13.6 SCREENING FOR CARDIOVASCULAR CONDITION: ICD-10-CM

## 2025-06-25 DIAGNOSIS — G43.B0 OPHTHALMOPLEGIC MIGRAINE, NOT INTRACTABLE: Primary | ICD-10-CM

## 2025-06-25 LAB
ALBUMIN SERPL BCG-MCNC: 4.4 G/DL (ref 3.5–5)
ALP SERPL-CCNC: 77 U/L (ref 34–104)
ALT SERPL W P-5'-P-CCNC: 16 U/L (ref 7–52)
AMORPH URATE CRY URNS QL MICRO: ABNORMAL
ANION GAP SERPL CALCULATED.3IONS-SCNC: 11 MMOL/L (ref 4–13)
AST SERPL W P-5'-P-CCNC: 21 U/L (ref 13–39)
B-HCG SERPL-ACNC: <0.6 MIU/ML (ref 0–5)
BACTERIA UR QL AUTO: ABNORMAL /HPF
BASOPHILS # BLD AUTO: 0.04 THOUSANDS/ÂΜL (ref 0–0.1)
BASOPHILS NFR BLD AUTO: 0 % (ref 0–1)
BILIRUB SERPL-MCNC: 0.34 MG/DL (ref 0.2–1)
BILIRUB UR QL STRIP: NEGATIVE
BUN SERPL-MCNC: 14 MG/DL (ref 5–25)
CALCIUM SERPL-MCNC: 9.1 MG/DL (ref 8.4–10.2)
CHLORIDE SERPL-SCNC: 103 MMOL/L (ref 96–108)
CHOLEST SERPL-MCNC: 173 MG/DL (ref ?–200)
CLARITY UR: ABNORMAL
CO2 SERPL-SCNC: 24 MMOL/L (ref 21–32)
COLOR UR: YELLOW
CREAT SERPL-MCNC: 0.63 MG/DL (ref 0.6–1.3)
EOSINOPHIL # BLD AUTO: 0.16 THOUSAND/ÂΜL (ref 0–0.61)
EOSINOPHIL NFR BLD AUTO: 2 % (ref 0–6)
ERYTHROCYTE [DISTWIDTH] IN BLOOD BY AUTOMATED COUNT: 13.2 % (ref 11.6–15.1)
GFR SERPL CREATININE-BSD FRML MDRD: 125 ML/MIN/1.73SQ M
GLUCOSE P FAST SERPL-MCNC: 77 MG/DL (ref 65–99)
GLUCOSE UR STRIP-MCNC: NEGATIVE MG/DL
HCT VFR BLD AUTO: 39.6 % (ref 34.8–46.1)
HDLC SERPL-MCNC: 47 MG/DL
HGB BLD-MCNC: 12.2 G/DL (ref 11.5–15.4)
HGB UR QL STRIP.AUTO: NEGATIVE
IMM GRANULOCYTES # BLD AUTO: 0.03 THOUSAND/UL (ref 0–0.2)
IMM GRANULOCYTES NFR BLD AUTO: 0 % (ref 0–2)
KETONES UR STRIP-MCNC: NEGATIVE MG/DL
LDLC SERPL CALC-MCNC: 96 MG/DL (ref 0–100)
LEUKOCYTE ESTERASE UR QL STRIP: NEGATIVE
LYMPHOCYTES # BLD AUTO: 2.73 THOUSANDS/ÂΜL (ref 0.6–4.47)
LYMPHOCYTES NFR BLD AUTO: 27 % (ref 14–44)
MCH RBC QN AUTO: 26.8 PG (ref 26.8–34.3)
MCHC RBC AUTO-ENTMCNC: 30.8 G/DL (ref 31.4–37.4)
MCV RBC AUTO: 87 FL (ref 82–98)
MONOCYTES # BLD AUTO: 0.5 THOUSAND/ÂΜL (ref 0.17–1.22)
MONOCYTES NFR BLD AUTO: 5 % (ref 4–12)
MUCOUS THREADS UR QL AUTO: ABNORMAL
NEUTROPHILS # BLD AUTO: 6.68 THOUSANDS/ÂΜL (ref 1.85–7.62)
NEUTS SEG NFR BLD AUTO: 66 % (ref 43–75)
NITRITE UR QL STRIP: NEGATIVE
NON-SQ EPI CELLS URNS QL MICRO: ABNORMAL /HPF
NONHDLC SERPL-MCNC: 126 MG/DL
NRBC BLD AUTO-RTO: 0 /100 WBCS
PH UR STRIP.AUTO: 6 [PH]
PLATELET # BLD AUTO: 474 THOUSANDS/UL (ref 149–390)
PMV BLD AUTO: 9.7 FL (ref 8.9–12.7)
POTASSIUM SERPL-SCNC: 4.3 MMOL/L (ref 3.5–5.3)
PROT SERPL-MCNC: 7.4 G/DL (ref 6.4–8.4)
PROT UR STRIP-MCNC: ABNORMAL MG/DL
RBC # BLD AUTO: 4.56 MILLION/UL (ref 3.81–5.12)
RBC #/AREA URNS AUTO: ABNORMAL /HPF
SODIUM SERPL-SCNC: 138 MMOL/L (ref 135–147)
SP GR UR STRIP.AUTO: 1.04 (ref 1–1.03)
TRIGL SERPL-MCNC: 148 MG/DL (ref ?–150)
TSH SERPL DL<=0.05 MIU/L-ACNC: 0.77 UIU/ML (ref 0.45–4.5)
UROBILINOGEN UR STRIP-ACNC: <2 MG/DL
WBC # BLD AUTO: 10.14 THOUSAND/UL (ref 4.31–10.16)
WBC #/AREA URNS AUTO: ABNORMAL /HPF

## 2025-06-25 PROCEDURE — 84443 ASSAY THYROID STIM HORMONE: CPT

## 2025-06-25 PROCEDURE — 80053 COMPREHEN METABOLIC PANEL: CPT

## 2025-06-25 PROCEDURE — 81001 URINALYSIS AUTO W/SCOPE: CPT

## 2025-06-25 PROCEDURE — 99214 OFFICE O/P EST MOD 30 MIN: CPT | Performed by: FAMILY MEDICINE

## 2025-06-25 PROCEDURE — 36415 COLL VENOUS BLD VENIPUNCTURE: CPT

## 2025-06-25 PROCEDURE — 84702 CHORIONIC GONADOTROPIN TEST: CPT

## 2025-06-25 PROCEDURE — 85025 COMPLETE CBC W/AUTO DIFF WBC: CPT

## 2025-06-25 PROCEDURE — 80061 LIPID PANEL: CPT

## 2025-06-25 RX ORDER — NAPROXEN 500 MG/1
500 TABLET ORAL 2 TIMES DAILY WITH MEALS
Qty: 60 TABLET | Refills: 1 | Status: SHIPPED | OUTPATIENT
Start: 2025-06-25

## 2025-06-25 RX ORDER — SULFAMETHOXAZOLE AND TRIMETHOPRIM 400; 80 MG/1; MG/1
1 TABLET ORAL DAILY
COMMUNITY
Start: 2025-05-19

## 2025-06-25 RX ORDER — SPIRONOLACTONE 50 MG/1
TABLET, FILM COATED ORAL
COMMUNITY
Start: 2025-05-19

## 2025-06-25 NOTE — ASSESSMENT & PLAN NOTE
Depression Screening Follow-up Plan: Patient's depression screening was positive with a PHQ-9 score of 6. Patient tried Wellbutrin but made her even more anxious. She heard from [facility] and was told she can not be seen until February. She is looking for another Mental health provider in the area. Will extend leave for now until she gets appropriate help

## 2025-06-25 NOTE — ASSESSMENT & PLAN NOTE
{If prescribing weight loss medication, click here to fill out prior auth smartform and then hit F2 with this smartlist to insert prior auth documentation (Optional):[id number]}    Orders:    liraglutide (SAXENDA) injection; Inject 0.1 mL (0.6 mg total) under the skin daily    TSH, 3rd generation; Future

## 2025-06-25 NOTE — PROGRESS NOTES
Name: Dennise Preston      : 2000      MRN: [id number]  Encounter Provider: AUTUMN Norwood  Encounter Date: 2025   Encounter department: [facility] PRIMARY CARE  :  Assessment & Plan  Ophthalmoplegic migraine, not intractable      Orders:    Ambulatory Referral to Neurology; Future    Obesity (BMI 30-39.9)      Orders:    liraglutide (SAXENDA) injection; Inject 0.1 mL (0.6 mg total) under the skin daily    TSH, 3rd generation; Future    Screening for cardiovascular condition    Orders:    CBC and differential; Future    Comprehensive metabolic panel; Future    Lipid panel; Future    Nausea    Orders:    hCG, quantitative; Future    Urinalysis with microscopic; Future    Acute midline low back pain without sciatica    Orders:    naproxen (Naprosyn) 500 mg tablet; Take 1 tablet (500 mg total) by mouth 2 (two) times a day with meals    Mild intermittent asthma, unspecified whether complicated  Controlled with Albuterol                      Seasonal allergic rhinitis due to pollen  Loratidine and eye drops.                Anxiety  Increased since father's death anniversary is coming up. FMLA forms filled out. Wellbutrin gave patient palpitations so she stopped taking it.patient will follow up with Mental health services. They could not see her until February so she will look for someone in the area. Patient has been calling around and is on many waiting lists.  Made an appointment for May however has to pay out of pocket               Moderate episode of recurrent major depressive disorder (HCC)  Depression Screening Follow-up Plan: Patient's depression screening was positive with a PHQ-9 score of 6. Patient tried Wellbutrin but made her even more anxious. She heard from [facility] and was told she can not be seen until February. She is looking for another Mental health provider in the area. Will extend leave for now until she gets appropriate help                Depression Screening  "and Follow-up Plan: Patient's depression screening was positive with a PHQ-9 score of 6.   Patient assessed for underlying major depression. Brief counseling provided and recommend additional follow-up/re-evaluation next office visit.       History of Present Illness     Patient having back pain x 6 days. Denies injury, trauma. Patient took Tylenol and naproxen and used a heating pad w/o relief.Pain is in mid back, intermittent. Feels like soreness.  Denies radiation. Denies numbness, tingling. For the last 3 days she has noticed some nausea which has been constant.     Back Pain  Pertinent negatives include no abdominal pain, chest pain, dysuria or fever.     Review of Systems   Constitutional:  Negative for chills and fever.   HENT:  Negative for ear pain and sore throat.    Eyes:  Negative for pain and visual disturbance.   Respiratory:  Negative for cough and shortness of breath.    Cardiovascular:  Negative for chest pain and palpitations.   Gastrointestinal:  Negative for abdominal pain and vomiting.   Genitourinary:  Negative for dysuria and hematuria.   Musculoskeletal:  Positive for back pain. Negative for arthralgias.   Skin:  Negative for color change and rash.   Neurological:  Positive for light-headedness. Negative for seizures and syncope.   Psychiatric/Behavioral:  The patient is nervous/anxious.    All other systems reviewed and are negative.      Objective   /72 (BP Location: Left arm, Patient Position: Sitting, Cuff Size: Standard)   Pulse 74   Ht 5' 2\" (1.575 m)   Wt 79.7 kg (175 lb 9.6 oz)   SpO2 98%   BMI 32.12 kg/m²      Physical Exam  Vitals and nursing note reviewed.   Constitutional:       General: She is not in acute distress.     Appearance: She is well-developed.   HENT:      Head: Normocephalic and atraumatic.     Eyes:      Conjunctiva/sclera: Conjunctivae normal.       Cardiovascular:      Rate and Rhythm: Normal rate and regular rhythm.      Heart sounds: No murmur " heard.  Pulmonary:      Effort: Pulmonary effort is normal. No respiratory distress.      Breath sounds: Normal breath sounds.   Abdominal:      Palpations: Abdomen is soft.      Tenderness: There is no abdominal tenderness.     Musculoskeletal:         General: No swelling.      Cervical back: Neck supple.     Skin:     General: Skin is warm and dry.      Capillary Refill: Capillary refill takes less than 2 seconds.     Neurological:      Mental Status: She is alert.     Psychiatric:         Mood and Affect: Mood normal.       Depression Screening Follow-up Plan: Patient's depression screening was positive with a PHQ-9 score of 6.

## 2025-06-26 ENCOUNTER — TELEPHONE (OUTPATIENT)
Age: 25
End: 2025-06-26

## 2025-06-26 NOTE — TELEPHONE ENCOUNTER
PA for Saxenda 18MG/3ML SUBMITTED to [employer]    via    [x]CMM-KEY: [id number]  []Surescripts-Case ID #   []Availity-Auth ID # NDC #   []Faxed to plan   []Other website   []Phone call Case ID #     [x]PA sent as URGENT    All office notes, labs and other pertaining documents and studies sent. Clinical questions answered. Awaiting determination from insurance company.     Turnaround time for your insurance to make a decision on your Prior Authorization can take 7-21 business days.

## 2025-06-26 NOTE — TELEPHONE ENCOUNTER
PA for Saxenda 18MG/3ML APPROVED     Date(s) approved 06/26/2025-12/26/2025    Patient advised by          []MyChart Message  [x]Phone call   []LMOM  []L/M to call office as no active Communication consent on file  []Unable to leave detailed message as VM not approved on Communication consent       Pharmacy advised by    [x]Fax  []Phone call    Approval letter scanned into [employer] Yes

## 2025-06-27 ENCOUNTER — TELEPHONE (OUTPATIENT)
Age: 25
End: 2025-06-27

## 2025-06-27 DIAGNOSIS — R79.89 HIGH PLATELET COUNT: Primary | ICD-10-CM

## 2025-06-27 DIAGNOSIS — R11.0 NAUSEA: ICD-10-CM

## 2025-06-27 DIAGNOSIS — E66.9 OBESITY (BMI 30-39.9): ICD-10-CM

## 2025-06-27 DIAGNOSIS — R42 VERTIGO: Primary | ICD-10-CM

## 2025-06-27 RX ORDER — ONDANSETRON 4 MG/1
4 TABLET, FILM COATED ORAL EVERY 8 HOURS PRN
Qty: 20 TABLET | Refills: 0 | Status: SHIPPED | OUTPATIENT
Start: 2025-06-27

## 2025-06-27 RX ORDER — MECLIZINE HCL 12.5 MG 12.5 MG/1
12.5 TABLET ORAL 3 TIMES DAILY PRN
Qty: 30 TABLET | Refills: 0 | Status: SHIPPED | OUTPATIENT
Start: 2025-06-27

## 2025-07-16 ENCOUNTER — OFFICE VISIT (OUTPATIENT)
Age: 25
End: 2025-07-16
Payer: COMMERCIAL

## 2025-07-16 VITALS
BODY MASS INDEX: 32.81 KG/M2 | OXYGEN SATURATION: 98 % | DIASTOLIC BLOOD PRESSURE: 78 MMHG | SYSTOLIC BLOOD PRESSURE: 118 MMHG | WEIGHT: 178.3 LBS | HEART RATE: 80 BPM | HEIGHT: 62 IN

## 2025-07-16 DIAGNOSIS — F33.1 MODERATE EPISODE OF RECURRENT MAJOR DEPRESSIVE DISORDER (HCC): ICD-10-CM

## 2025-07-16 DIAGNOSIS — F41.9 ANXIETY: ICD-10-CM

## 2025-07-16 DIAGNOSIS — E66.9 OBESITY (BMI 30-39.9): ICD-10-CM

## 2025-07-16 DIAGNOSIS — J30.1 SEASONAL ALLERGIC RHINITIS DUE TO POLLEN: ICD-10-CM

## 2025-07-16 DIAGNOSIS — J45.20 MILD INTERMITTENT ASTHMA, UNSPECIFIED WHETHER COMPLICATED: Primary | ICD-10-CM

## 2025-07-16 DIAGNOSIS — G43.109 OPHTHALMIC MIGRAINE: ICD-10-CM

## 2025-07-16 PROCEDURE — 99214 OFFICE O/P EST MOD 30 MIN: CPT | Performed by: FAMILY MEDICINE

## 2025-07-16 NOTE — PROGRESS NOTES
Name: Dennise Preston      : 2000      MRN: [id number]  Encounter Provider: AUTUMN Norwood  Encounter Date: 2025   Encounter department: [facility] PRIMARY CARE  :  Assessment & Plan  Anxiety  Increased since father's death anniversary is coming up. FMLA forms filled out. Wellbutrin gave patient palpitations so she stopped taking it.patient will follow up with Mental health services. They could not see her until February so she will look for someone in the area. Patient has been calling around and is on many waiting lists. Made an appointment for May however has to pay out of pocket          Ophthalmic migraine    ER work up WNL. Patient was referred to Neurology and Ophthalmology but has not been able to make an appointment. Patient instructed to call the specialist and get an appointment. ER precautions reviewed       Mild intermittent asthma, unspecified whether complicated  Controlled with Albuterol          Seasonal allergic rhinitis due to pollen  Loratidine and eye drops.                      Moderate episode of recurrent major depressive disorder (HCC)  Depression Screening Follow-up Plan: Patient's depression screening was positive with a PHQ-9 score of 6. Patient tried Wellbutrin but made her even more anxious. She heard from [facility] and was told she can not be seen until February. She is looking for another Mental health provider in the area. Will extend leave for now until she gets appropriate help          Obesity (BMI 30-39.9)      Orders:    Insulin Pen Needle 32G X 5 MM MISC; Use in the morning           History of Present Illness   Dennise Preston, a 24-year-old patient, was approved for Saxenda 18MG/3ML on 2025, On 2025, she reported an ophthalmoplegic migraine and was referred to neurology [ She has not been able to schedule an appointment yet. She also experienced acute low back pain and nausea, prompting further tests . Her mental health  "concerns include anxiety and depression, exacerbated by her father's death anniversary, with ongoing efforts to access mental health support .She has a history of asthma and allergic rhinitis, managed with Albuterol and Loratadine      Review of Systems   Constitutional:  Negative for chills and fever.   HENT:  Negative for ear pain and sore throat.    Eyes:  Negative for pain and visual disturbance.   Respiratory:  Negative for cough and shortness of breath.    Cardiovascular:  Negative for chest pain and palpitations.   Gastrointestinal:  Negative for abdominal pain and vomiting.   Genitourinary:  Negative for dysuria and hematuria.   Musculoskeletal:  Positive for back pain. Negative for arthralgias.   Skin:  Negative for color change and rash.   Neurological:  Positive for light-headedness and headaches. Negative for seizures and syncope.   Psychiatric/Behavioral:  The patient is nervous/anxious.    All other systems reviewed and are negative.      Objective   /78 (BP Location: Left arm, Patient Position: Sitting, Cuff Size: Standard)   Pulse 80   Ht 5' 2\" (1.575 m)   Wt 80.9 kg (178 lb 4.8 oz)   SpO2 98%   BMI 32.61 kg/m²      Physical Exam  Vitals and nursing note reviewed.   Constitutional:       General: She is not in acute distress.     Appearance: She is well-developed.   HENT:      Head: Normocephalic and atraumatic.     Eyes:      Conjunctiva/sclera: Conjunctivae normal.       Cardiovascular:      Rate and Rhythm: Normal rate and regular rhythm.      Heart sounds: No murmur heard.  Pulmonary:      Effort: Pulmonary effort is normal. No respiratory distress.      Breath sounds: Normal breath sounds.   Abdominal:      Palpations: Abdomen is soft.      Tenderness: There is no abdominal tenderness.     Musculoskeletal:         General: No swelling.      Cervical back: Neck supple.     Skin:     General: Skin is warm and dry.      Capillary Refill: Capillary refill takes less than 2 seconds. "     Neurological:      Mental Status: She is alert.     Psychiatric:         Mood and Affect: Mood normal.

## 2025-07-16 NOTE — ASSESSMENT & PLAN NOTE
Depression Screening Follow-up Plan: Patient's depression screening was positive with a PHQ-9 score of 6. Patient tried Wellbutrin but made her even more anxious. She heard from [facility] and was told she can not be seen until February. She is looking for another Mental health provider in the area. Will extend leave for now until she gets appropriate help

## 2025-07-16 NOTE — ASSESSMENT & PLAN NOTE
{If prescribing weight loss medication, click here to fill out prior auth smartform and then hit F2 with this smartlist to insert prior auth documentation (Optional):[id number]}    Orders:    Insulin Pen Needle 32G X 5 MM MISC; Use in the morning

## 2025-07-16 NOTE — ASSESSMENT & PLAN NOTE
ER work up WNL. Patient was referred to Neurology and Ophthalmology but has not been able to make an appointment. Patient instructed to call the specialist and get an appointment. ER precautions reviewed